# Patient Record
Sex: FEMALE | Race: WHITE
[De-identification: names, ages, dates, MRNs, and addresses within clinical notes are randomized per-mention and may not be internally consistent; named-entity substitution may affect disease eponyms.]

---

## 2019-06-24 NOTE — CON
DATE OF CONSULTATION:  06/24/2019



PRIMARY CARDIOLOGIST:  Sheri Isidro MD



REASON FOR CONSULTATION:  Wide-complex tachycardia.



HISTORY OF PRESENT ILLNESS:  Mrs. Ortiz is a very pleasant 78-year-old white

female, who comes to the hospital for just feeling having difficulty ambulating.

She was seen in the ER in [place] and there was a wide-complex rhythm concerning for

VT, so she was transferred over from [place] to this facility for further evaluation

and care.  She does have a history of atrial fibrillation and she has tachy-allyn

syndrome in the past and had a pacemaker placed as well.  Followed by Dr. Isidro as

well as [place] [facility].  She has had several ablations in the past.

Currently, Mrs. Ortiz feels well.  Denies any chest pain, tightness, pressure.  No

shortness of breath.  No lightheadedness. 



PAST MEDICAL HISTORY:  

1. Sick sinus syndrome, status post pacemaker.

2. Atrial fibrillation.

3. Hypertrophic cardiomyopathy by cardiac MRI, very mild.

4. Normal LV function.

5. Presence of a small PFO with left-to-right shunt.

6. Moderate to severe tricuspid regurgitation.

7. Hypertension.

8. Hyperlipidemia.

9. Chronic anticoagulation with Warfarin.



ALLERGIES:  CODEINE, SULFA, AND PENICILLIN.  THEY ALL GIVE A RASH.



OUTPATIENT MEDICATIONS:  Include:

1. Tylenol p.r.n.

2. Warfarin.

3. Metoprolol 100 mg a day.

4. Gabapentin.

5. Doxazosin.

6. Flecainide 50 mg b.i.d.

7. Losartan 100 mg a day.

8. Macrodantin 50 mg q.i.d.

9. Nifedipine 90 mg a day.

10. Lovastatin 20 mg q.p.m.

11. Paroxetine 20 mg a day.

12. Oxybutynin.

13. Nortriptyline.

14. Clonidine 0.1 mg a day.



PAST SURGICAL HISTORY:  

1. Cholecystectomy.

2. Left breast lump removal.

3. Back surgery twice.

4. Bladder lift.

5. Hysterectomy.

6. Neck surgery.

7. Cardiac ablation in the past.



SOCIAL HISTORY:  No alcohol, tobacco, or drugs.



FAMILY HISTORY:  No significant early coronary artery disease.



REVIEW OF SYSTEMS:  A 12-point review of systems was done and was all negative

unless stated in the history of present illness. 



PHYSICAL EXAMINATION:

VITAL SIGNS:  Temperature 98.1, pulse between , respiratory rate 18,

saturating 98% on 2 L nasal cannula. 

GENERAL:  Awake, alert, oriented x3.  No distress. 

HEENT:  Normocephalic, atraumatic. 

NECK:  Supple. 

LUNGS:  Clear. 

CARDIOVASCULAR:  S1 and S2.  No S3 or S4.  No murmurs.  Heart rate is irregularly

irregular. 

ABDOMEN:  Soft, positive bowel sounds. 

EXTREMITIES:  No edema. 

SKIN:  Warm and dry.



LABORATORY DATA:  Laboratory work was reviewed.  INR of 2.7.  BNP of 733.  Troponin

is undetectable x3.  CMP was reviewed from outside facility. 



Interrogation of the pacemaker showed atrial fibrillation.  She has had several

episodes of trying to be paced out in the atria, just doing antitachycardia pacing

in the atria, which were unsuccessful.  She has been in atrial fibrillation

apparently for the last few weeks now continuously. 



ASSESSMENT AND PLAN:  

1. Wide-complex tachycardia.  This is a paced rhythm and no ventricular tachycardia

has been recorded on pacemaker.  Most likely this is just her atrial fibrillation. 

2. Atrial fibrillation, persistent.

3. Acute on chronic diastolic versus systolic heart failure.



PLAN:  

1. We will increase her dose of IV Lasix to try to diurese her a little bit better.

2. Once she is more stable, we will plan on doing a DEMETRIO cardioversion.

3. Continue amiodarone drip for now.  Likely DEMETRIO cardioversion in the next 2 or 3

days. 

4. We will leave n.p.o. just in case, she is ready for the procedure tomorrow.

5. Echocardiogram to be done.

6. We will follow.







Job ID:  [id number]

## 2019-06-24 NOTE — PDOC.CTH
Cardiology Progress Note





- Labs


 Troponin/CKMB











Troponin I  Less than  0.010 ng/mL (< 0.028)   06/24/19  12:11    














- Assessment/Plan





Cardiology Consult Note





Primary Cardiologist: Sanna





HPI:





This is an 78 F who comes in with chief complaint of malaise and shortness of 

breath which started last night. She has PMH including HTN, HLD, Pacemaker, and 

Ablation in 2007. She denies chest pain or palpitations but has been feeling 

weak. She states that normally she is able to walk around her home without 

issues but since last night has not been feeling well. The feeling started 

when she was laying down last night and nothing has made it better or worse. 

She denies shortness of breath with laying down but states her legs have been 

getting progressively more swollen for a few weeks. EF in dec 2017 was 55%





PMH: HTN, HLD, Aortic and mitral valve regurgitation


PSH: ablation 2007


Meds: clonidine, losartan, oxybutynin, paroxetine, warfarin, nifedipine, 

valacyclovir, metoprolol, gabapentin, flecainide, doxazosin, albuterol


Allergies: sulfa, codeine, penicillin


Soc Hx: denies JAX


Fm Hx: non-contributory





REVIEW OF SYSTEMS: 


Gen: no fever, chills, or sweats, + malaise


Neuro: denies headache


Eyes: no visual changes


ENT: no hearing changes, no sore throat, no congestion


Resp: denies cough, + SOB


Card: no chest pain, denies palpitations, denies diaphoresis, + leg swelling, 

denies orthopnea


GI: no N/V/D, no abdominal pain


MSK: no myalgias, no joint pain/stiffness


Heme: no easy bruising/bleeding, on warfarin


Skin: no rash, no erythema





Vitals:  T: 98.7  R: 18   BP: 133/92  P:90  Sat: 91% on 2L  Wt: 83kg





PHYSICAL EXAMINATION: 


General: NAD, alert and oriented x3


HEENT: PERRLA, EOMI, normal sclera, oropharynx without erythema or exudate


Neck: Supple. Full ROM.


Heart/Cardiovascular System: irregularly irregular rhythm, Cap refill < 3 

seconds, no rub, no murmur


Lungs/Respiratory System:  CTA-B, no resp distress


Abdomen/Gastro-Intestinal System: no abdominal tenderness, normal bowel sounds


Extremities: Warm extremities. +3 pitting edema bilaterally


Neuro: No gross deficits appreciated. CN 2-12 grossly intact


Psychiatry: Awake, Alert and cooperative with exam


Skin: No lesions, rashes, or ulcers


Musculoskeletal: Full ROM





A/P:





# Recurrent PVCs, afib rate controlled


- bmp WNL, INR 2.7 on coumadin


- received 150mg amio IV at outside ED, currently on drip


- pacemaker interrogation shows 90% Afib/A flutter


- review of pacer interrogation shows paced rhythm, ATPs, not VF/VT


- trop neg x2





- EP consulted appreciate recs





# CHF, possible mild exacerbation


- wt 78kg in dec 2017, currently 83kg


- cxr showed mild bilateral pleural effusions


- BNP, echo pending


- lasix 40mg BID





-   See Dr. Izaguirre note to follow

## 2019-06-24 NOTE — CON
DATE OF CONSULTATION:  06/24/2019



ADDITIONAL REFERRING PHYSICIAN:  Naz Crooks MD



HISTORY OF PRESENT ILLNESS:  I am seeing Ms. Ortiz at our [facility] ICU as an Electrophysiology consultant.  Her problems are; 

1. Wide-complex beat likely pacing so far did not find ventricular tachycardia.

2. Atrial fibrillation with rapid ventricular rates persisting since the 
beginning

of March. 

a. Prior history of paroxysmal atrial fibrillation, reasonably suppressed in the

past with low-dose flecainide. 

    b. Hypertrophic cardiomyopathy by cardiac MRI.

c. 2D echo from 10/30/2017 reveals LVEF 65%, mild AI, mild left atrial 
enlargement,

moderate-to-severe TR, small PFO with left-to-right shunt, moderate MR, mild 
AS. 

    d. Stress test on 12/18/2017 without ischemia, LVEF 67%.

3. History of PFO with left-to-right shunt.

4. History of hypertension and hyperlipidemia.

5. CHADS-VASc score of 4 with a gender, hypertension on warfarin therapy with

adequate INR today at 2.7. 



ALLERGIES:  CODEINE, SULFA, AND PENICILLIN.



MEDICATIONS:  At home included;

1. Amiodarone.

2. Magnesium.

3. Tylenol.

4. Bisacodyl.

5. Calcium carbonate.

6. Ondansetron.

7. Senokot.

8. Benzonatate.

9. Clonidine.

10. Guaifenesin.

11. Hydralazine.

12. Nitroglycerin.

13. Sennoside.

14. Sodium chloride.

15. Famotidine.

16. Furosemide. 



This is a current admission medications.



SUBJECTIVE:  Ms. Ortiz is here with progressive dyspnea.  She had difficulty 
to

get around when she was ambulating.  On the other hand, she denies PND or 
orthopnea.

 She has noticed some swelling, especially in her lower extremities.  Recently, 
she

was diagnosed with herpes zoster with rash on the left side of her chest.  She 
is

receiving medications for that.  She does not pass out.  No stroke-like 
symptoms.

No neurological deficits.  She has no fever or chills.  No cough.  No bleeding

issues.  Continues warfarin, adjusted by Dr. Ferguson and rest of 12-point review 
of

system otherwise unremarkable. 



PAST MEDICAL HISTORY:  As above.  The patient had history of sick sinus syndrome

post pacemaker implantation with a Medtronic Advisa dual-chamber device from

September 2018. 



SOCIAL HISTORY:  The patient denies smoking, EtOH, or drug abuse.  She has a

daughter involved in her care and present today as well. 



FAMILY HISTORY:  Not contributory.



OBJECTIVE DATA:  VITAL SIGNS:  Blood pressure is 125/67, heart rate 86, 
respirations

18, and temperature is afebrile. 

GENERAL:  Alert and oriented woman with elevated BMI. 

NECK:  Supple.  Jugular veins are distended. 

CHEST:  Coarse, I do not hear crackles. 

HEART:  Sounds are irregularly irregular.  S1 and S2 are variable.  No murmur or

gallop. 

ABDOMEN:  Benign.  Bowel sounds positive. 

EXTREMITIES:  Lower extremities are without edema, clubbing, or cyanosis. 

NEUROLOGIC:  The patient is nonfocal. 

MUSCULOSKELETAL:  Without joint swelling or deformity. 

SKIN:  Without rash.  The epicardial pacemaker insertion site is well healed.



LABORATORY DATA:  Lab data is pending from today.  The EKG was reviewed 
revealing

atrial fibrillation with intermittent ventricular pacing.  Some EKGs do 
demonstrate

frequent pacing spike correlating to atrial and tachycardia pacing. 



The pace interrogation was reviewed and revealing a Medtronic Advisa DR dual-
chamber

pacemaker with adequate battery voltage, implanted many years ago.  The lead

impedance 418 ohms in both leads.  Sensing 0.8 mV, fibrillation is 5.6 mV right

ventricle.  The ablation seems to persist since the beginning of March.  I also

reviewed lab data from the [facility], BNP is 713.  BUN is 16,

creatinine 0.84, AST and ALT is 21 and 14.  White cell count 5.7, hemoglobin 
10.7,

and platelet count is 323.  Chest x-ray shows cardiomegaly and pulmonary 
vascular

congestion, bilateral pleural effusion. 



ASSESSMENT AND PLAN:  Ms. Ortiz is a pleasant 78-year-old woman with prior 
history

of paroxysmal atrial fibrillation - previously well suppressed with flecainide. 
Now, although she 

has been taking this medication, she is persisting atrial

fibrillation since March.  She has rapid ventricular rates.

Also has some test findings and signs and symptoms of fluid overload/congestive

heart failure exacerbation. 



We discussed the connection between atrial fibrillation rapid rates, the heart

failure, and her symptoms.  She has been already started on IV amiodarone, 
which has

lowered her heart rates and she is feeling slightly better.  She is also being

diuresed. 



Future plans were discussed with Dr. Izaguirre.  It would be reasonable to continue

diuresis and optimize her volume status along with ventricular rate control.  
Once

that was achieved, cardioversion could be considered.  At this point, non-
reasonable

to continue short-term IV and then p.o. amiodarone therapy, although now 
consider

not using it long-term, has a potential side effects. 



Long-term - a pulmonary venous isolation procedure could be considered instead 
of chronic

 amiodarone therapy. Alternatively, AV jakob blocking agents or AV jakob 
ablation is a possibility. 



Heart failure exacerbation likely diastolic in nature, hence there are 
previously

normal LVEF.  Repeat echo is advised.  She also had some degree of hypertrophic

cardiomyopathy, likely adding to the diastolic dysfunction. 



CHADS-VASc score of 4 or now 5 with congestive heart failure, clear risk for 
stroke,

I would recommend continued warfarin or alternating NOAC therapy long-term.  
Her INR

is adequate today. 



Discussed the plans with Dr. Izaguirre and Dr. Crooks will be notified and we will

plan to follow up with you. 



Thank you again for allowing to participate in care of this patient.







Job ID:  [id number]



CATARINA

## 2019-06-24 NOTE — CON
DATE OF CONSULTATION:  



HISTORY OF PRESENT ILLNESS:  Danni Ortiz is an 78-year-old female, who presented

to the ER with rapid heart rate, malfunctioning pacemaker.  She sees a local

cardiologist.  She is now on an amiodarone drip.  She is in the MICU.  Reason for

consult, nonsmoker, no history of pneumonia or TB asthma. 



Apparently, she has had history of blood clots in the past, though.  She takes

anticoagulation for a long periods of time.  She has had herpes zoster infection for

the last week, seen by primary care physician in [place], [place], placed on

medication.  Her legs are swollen according to her daughter.  Denies any snoring. 



PAST MEDICAL HISTORY:  SVT, hypertension, high cholesterol, pacemaker.



PAST SURGICAL HISTORY:  __________ left breast, bladder lift, gallbladder ablation

in 1999, hysterectomy, neck surgery. 



HABITS:  Alcohol, none.  Tobacco, none.  Substance, none.



HOME MEDICATIONS:  

1. Clonidine 0.1.

2. Coumadin.

3. Paxil 20.

4. Ditropan 10.

5. Pamelor 10.

6. Metoprolol 100.

7. Mevacor 20.

8. Losartan 100.

9. Gabapentin 3 times a day.

10. Tambocor 50 twice a day.

11. Doxazosin __________.



ALLERGIES:  CODEINE, PENICILLIN, AND SULFA.



SOCIAL HISTORY:  She worked for [employer].



FAMILY HISTORY:  Unremarkable.



REVIEW OF SYMPTOMS:  Negative.



PHYSICAL EXAMINATION:

VITAL SIGNS:  Saturations are 93%-94% on supplemental oxygen, blood pressure 143/84,

pulse 80 to 90, respirations 16. 

CHEST:  No wheezing or crackle. 

CARDIAC:  Normal S1 and S2.  No gallops. 

ABDOMEN:  No masses.



LABORATORY DATA/IMAGING STUDIES:  INR is 2.7.  Otherwise lab from [place] was

otherwise as noted. 



EKG shows SVT. 



Lytes are normal.  Potassium 3.7, BUN and creatinine are unremarkable.  White count

is 5000 noted. 



X-ray shows cardiomegaly, vascular congestion, small bilateral pleural effusions.



IMPRESSION:  New onset supraventricular tachycardia, congestive heart failure,

herpes zoster. 



PLAN:  Pulmonary wise, continue amiodarone, Lasix, diuretics. 



Pulmonary will follow on the MICU. 



Consultation note is 70 minutes, 50% direct patient care.







Job ID:  [id number]

## 2019-06-24 NOTE — HP
PRIMARY CARE PHYSICIAN:  Dr. Eliu Ferguson.



CHIEF COMPLAINT:  Chest pain and shortness of breath.



HISTORY OF PRESENT ILLNESS:  Ms. Ortiz is a pleasant 78-year-old female with past

medical history of atrial fibrillation, status post ablation in 2007 as well as

pacemaker placement, hypertension, dyslipidemia, who presented to the emergency room

with above-mentioned complaints.  Initially, she has presented to [facility] and was worked up there.  She was found to be in ventricular tachycardia,

required amiodarone bolus and then drip.  She was then transferred to our facility

for further evaluation and care.  Here upon presentation, her blood pressure was

133/92 with a heart rate of 92.  She continued to be in and out of ventricular

tachycardia and was bolused again by the ER physician with amiodarone dose.  Now,

amiodarone has been continued. 



The patient has history of atrial fibrillation and has been on flecainide and

Coumadin.  Her INR was 2.4 today. 



Her presenting symptoms included chest pain, shortness of breath, subjective fever,

nausea, and vomiting.  Pacemaker print out showed that the patient has been in

atrial fibrillation 90% of the time.  She is now being admitted for arrhythmias for

further workup.  ER physician has discussed the case with Cardiology on-call Dr. Izaguirre.  The patient has also seen Electrophysiology, Dr. Russell, in his clinic on a

semi-regular basis.  Her cardiac enzymes have been negative, but her BNP is elevated

and chest x-ray showed mild bilateral pleural effusion and pulmonary vascular

congestion.  She was also given magnesium sulfate 2 g in the ER. 



PAST MEDICAL HISTORY:  

1. History of atrial fibrillation, status post ablation in 2007.

2. Hypertension.

3. Dyslipidemia.

4. Pacemaker placement.



PAST SURGICAL HISTORY:  

1. Cholecystectomy.

2. Left breast lump removal.

3. Back surgery x2.

4. Bladder lift.

5. Hysterectomy.

6. Neck surgery.



PSYCHIATRIC HISTORY:  Depression.



SOCIAL HISTORY:  No history of drug, tobacco, or alcohol abuse.  Lives at home with

her family. 



FAMILY HISTORY:  No significant family history of coronary artery disease.  Her

sister had diabetes and stroke. 



ALLERGIES:  INCLUDE PENICILLIN AND SULFONAMIDES.



HOME MEDICATIONS:  As listed in the ER records;

1. Clonidine 0.1 mg daily.

2. Losartan 100 mg daily.

3. Lovastatin 20 mg daily.

4. Nitrofurantoin 50 mg daily.

5. Nortriptyline 10 mg daily.

6. Oxybutynin daily.

7. Paroxetine 20 mg daily.

8. Warfarin 2.5 mg daily.

9. Nifedipine 90 mg in the morning.

10. Valacyclovir 1000 mg t.i.d.

11. Metoprolol tartrate 100 mg daily.

12. Gabapentin 300 mg t.i.d.

13. Flecainide 50 mg daily.

14. Doxazosin 4 mg b.i.d.

15. Albuterol inhaler p.r.n.



CODE STATUS:  Full code discussed with the patient in detail.



REVIEW OF SYSTEMS:  Review of system is done.  It is negative except for those

mentioned in the history and physical. 



LABORATORY DATA:  The labs are reviewed that were done in [facility]

Emergency Room.  Her BNP is 731.  INR 2.3 with PT of 26, BUN 16, creatinine 0.84,

and bicarb 22.  Chest x-ray by my review shows mild pulmonary vascular congestion.

Troponin is less than 0.01, and her CBC is unremarkable except for hemoglobin at

10.7.  A strip evaluation __________ shows ventricular tachycardia with frequent

pacer spikes.  A 12-lead EKG shows AFib with RVR with heart rate 106 with frequent

PVCs. 



PHYSICAL EXAMINATION:

VITAL SIGNS:  Most recent vital signs; blood pressure 118/77, respirations 20,

saturating 91% on 2 L oxygen, pulse of 90, and temperature 98.5. 

GENERAL:  No acute distress.  She does look somewhat tired, but in no acute

distress.  Family is at bedside. 

HEENT:  Mucous membrane is moist and pink.  No oropharyngeal exudate or erythema.

Head is normocephalic and atraumatic.  Pupils are equal and reactive to light and

accommodation.  Extraocular movement intact. 

NECK:  Supple without any lymphadenopathy, JVD, or bruit. 

CHEST:  Clear to auscultation except for some few bibasilar crackles.  Her rate and

rhythm are irregularly irregular without any significant murmurs. 

ABDOMEN:  Soft, nontender, and nondistended with positive bowel sounds. 

EXTREMITIES:  Showed +3 pitting edema bilaterally. 

NEUROLOGIC:  Nonfocal. 

SKIN:  Free of any rashes or bruises.  Feels warm and dry to touch. 

PSYCHIATRIC:  Normal affect.



IMPRESSION AND PLAN:  

1. Cardiac arrhythmias.  She has been having ventricular tachycardia interspersed

with atrial fibrillation with rapid ventricular rate.  We will continue her on

amiodarone dose.  She is therapeutic with her Coumadin that she is compliant with.

Pacemaker interrogation reveals pacemaker malfunction.  We will consult Cardiology

and Electrophysiology for this very reason.  Continue to trend cardiac enzymes.  Her

second set of troponin is also negative.  She has been given Lasix in the ER and has

been diuresing.  We will add small dose of daily Lasix as well.  Transthoracic

echocardiogram will be also obtained.  She will be admitted to [facility] for frequent

ventricular tachycardia.  She is currently hemodynamically stable. 

2. Pulmonary edema.  She has flash pulmonary edema from arrhythmia.  She appears

comfortable for now.  We will provide her with another small dose of Lasix in the

morning.  Monitor clinically. 

3. History of atrial fibrillation.  As above, the patient's pacemaker needs to be

interrogated and adjusted further.  We will have EP, Dr. Russell, to see the patient,

who has seen the patient in the outpatient setting.  At this time, we will hold any

further anticoagulation as the patient is fully anticoagulated and she may need to

undergo some sort of cardiac procedure.  She will be kept n.p.o. for now. 

4. Hypertension, currently on the lower side.  We will hold her antihypertensives

and use p.r.n. antihypertensives for now. 

5. Dyslipidemia.  We will restart her statin.

6. History of anxiety.  Once again, restart her home medications when she is cleared

to take oral. 

7. Code status, full code discussed with the patient.

8. Deep venous thrombosis and gastrointestinal prophylaxis, but hold the

pharmacological deep venous thrombosis prophylaxis until Cardiology and EP have seen

the patient. 



DISPOSITION:  Ms. Ortiz is currently being admitted to [facility] for ventricular

tachycardia and atrial fibrillation with rapid ventricular rate.  Further management

will depend upon her clinical course, but estimated length of stay at this time is

at least 2 to 3 midnights. 







Job ID:  [id number]

## 2019-06-25 NOTE — PDOC.PN
- Subjective


Encounter Start Date: 06/25/19


Encounter Start Time: 14:15


Subjective: f/u for A-fibrillation RVR on Amiodarone gtt. Plan for DEMETRIO/

Cardioversion.





- Objective


MAR Reviewed: Yes


Vital Signs & Weight: 


 Vital Signs (12 hours)











  Temp Pulse Ox


 


 06/25/19 08:00   97


 


 06/25/19 07:07  97.9 F 


 


 06/25/19 03:53  97.8 F 








 Weight











Weight                         186 lb 14.4 oz











 Most Recent Monitor Data











Heart Rate from ECG            117


 


NIBP                           124/85


 


NIBP BP-Mean                   98


 


Respiration from ECG           16


 


SpO2                           98














I&O: 


 











 06/24/19 06/25/19 06/26/19





 06:59 06:59 06:59


 


Intake Total  790 


 


Output Total  0803 2080


 


Balance  -3875 -2650











Result Diagrams: 


 06/25/19 04:41





 06/25/19 04:41


Additional Labs: 





 Laboratory Tests











  06/24/19 06/24/19 06/24/19





  12:11 12:37 15:47


 


PT   28.4 H 


 


INR   2.7 


 


Troponin I  Less than  0.010   Less than  0.010


 


B-Natriuretic Peptide   














  06/24/19 06/24/19





  15:47 18:10


 


PT  


 


INR  


 


Troponin I   Less than  0.010


 


B-Natriuretic Peptide  733.0 H 











Radiology Reviewed by me: Yes (2D echo - EF 60-65%, mod TR, mod LAE)


EKG Reviewed by me: Yes (Tele - A-fib)





Phys Exam





- Physical Examination


Constitutional: NAD


HEENT: PERRLA, sclera anicteric, oral pharynx no lesions


Neck: no nodes, no JVD, supple, full ROM


few basilar crackles


Respiratory: no wheezing


S1, S2


Cardiovascular: no rub, gallop, irregular


Gastrointestinal: soft, non-tender, no distention, positive bowel sounds


Musculoskeletal: pulses present, edema present


Neurological: normal sensation, moves all 4 limbs


Psychiatric: A&O x 3


Skin: normal turgor, cap refill <2 seconds





Dx/Plan


(1) Atrial fibrillation with RVR


Code(s): I48.91 - UNSPECIFIED ATRIAL FIBRILLATION   Status: Acute   Comment: 

Persistent, DEMETRIO/CV today, continue anticoagulation, rate-control measures   





(2) Acute respiratory failure with hypoxia


Code(s): J96.01 - ACUTE RESPIRATORY FAILURE WITH HYPOXIA   Status: Acute   

Comment: Secondary to #1, O2 supplementation   





(3) Chronic anticoagulation


Code(s): Z79.01 - LONG TERM (CURRENT) USE OF ANTICOAGULANTS   Status: Chronic   

Comment: Continue Coumadin after DEMETRIO/CV   





(4) HTN (hypertension)


Code(s): I10 - ESSENTIAL (PRIMARY) HYPERTENSION   Status: Chronic   


Qualifiers: 


   Hypertension type: essential hypertension   Qualified Code(s): I10 - 

Essential (primary) hypertension   


Comment: Resume home BP regimen when tolerating po intake, serial monitoring   





- Plan


PT/OT, , respiratory therapy, DVT proph w/SCDs


Continue supportive mgmt


-: Resume home BP regimen when tolerating po


-: Continue Amiodarone


-: Continue Lasix 40mg IV BID


-: AM lab: BMP, CBC





* .

## 2019-06-25 NOTE — RAD
EXAM: Single view of the chest



HISTORY:   CHF



COMPARISON: 6/24/2017



FINDINGS: Single view of the chest shows an enlarged cardiomediastinal silhouette.  A pacemaker seen 
with leads in the right atrium and ventricle. Atelectasis is seen in the right lung base.  There is

no evidence of consolidation, mass, or pleural effusion. Degenerative changes are seen in the spine.



IMPRESSION: Cardiomegaly



Reported By: Sridhar Pink 

Electronically Signed:  6/25/2019 7:42 AM

## 2019-06-25 NOTE — PDOC.CTH
Cardiology Progress Note





- Subjective





EP PROGRESS NOTE: 6/25/19





Seen as follow up for AF RVR and medication management. Doing fair but remains 

in AF RVR. Dyspnea under better control today, breathing easier. 





- Objective


 Vital Signs











  Temp Pulse Ox


 


 06/25/19 16:00  98.6 F 


 


 06/25/19 08:00   97


 


 06/25/19 07:07  97.9 F 








 











Weight                         186 lb 14.4 oz














 











 06/24/19 06/25/19 06/26/19





 06:59 06:59 06:59


 


Intake Total  790 


 


Output Total  3475 2650


 


Balance  -2685 -2650














- Physical Examination


General/Neuro: alert & oriented x3, NAD


Neck: carotid US brisk, no JVD present


Lungs: CTA, unlabored respirations


Heart: PMI normal, other: (AF RVR)


Abdomen: NT/ND, soft





- Telemetry


Telemetry Rhythm: AF RVR





- Labs


Result Diagrams: 


 06/25/19 04:41





 06/25/19 04:41


 Troponin/CKMB











Troponin I  Less than  0.010 ng/mL (< 0.028)   06/24/19  18:10    














- Assessment/Plan





1. Atrial fibrillation


   - refractory to flecainide.


   - now on IV amiodarone. Will eventually transition to PO. Good short term 

option but poor long term due to side effects and toxicity concerns. 


   - DEMETRIO/CV with Dr Izaguirre scheduled today


2. Cardiomyopathy


   - diastolic


   - hypertrophic septum


   -likely exacerbated by tachycardia


   -continue to diurese


3. Chronic OAC for elevated CHADS2-VASC: 5


   - continue warfarin. Has been on NOAC in the past, unable to afford. 





DEMETRIO/CV to attempt to restore SR. Then transition to PO amio. Continue OAC with 

Warfarin, monitoring INR.

## 2019-06-25 NOTE — PRG
DATE OF SERVICE:  06/25/2019



SUBJECTIVE:  This morning, awake, alert, and responsive.  She is better.  Echo 
was

normal.  She had a slightly elevated right ventricular systolic pressure of 40.

Severe mitral annular calcification.  Still atrial fibrillation. 



OBJECTIVE:  VITAL SIGNS:  Saturations are 97% on 3 L, temperature 97, blood 
pressure

120/72, respiratory rate 18. 

CHEST:  Decreased breath sounds.  No wheezing. 

CARDIAC:  Normal S1, S2.  No gallops. 

ABDOMEN:  No masses.



LABORATORY DATA:  BNP is 733.  Lytes are normal.



IMPRESSION:  Congestive heart failure, diastolic dysfunction, atrial 
fibrillation,

pulmonary hypertension. 



PLAN:  Continue cardiac care.  Pulmonary will follow while in the MICU.







Job ID:  [id number]



MTDD

## 2019-06-26 NOTE — PDOC.CTH
Cardiology Progress Note





- Subjective





EP PROGRESS NOTE: 6/26/19





Seen as follow up for atrial arrhythmias. 


+SOB/ dyspnea, confusion, orthopnea, heart racing. 


-chest pain, dizziness, passing out











- Objective


 Vital Signs











  Temp Pulse Pulse Ox


 


 06/26/19 13:43   112 H 


 


 06/26/19 08:00    95


 


 06/26/19 07:00  98.4 F  


 


 06/26/19 03:21  97.8 F  








 











Weight                         186 lb 14.4 oz














 











 06/25/19 06/26/19 06/27/19





 06:59 06:59 06:59


 


Intake Total 790 837 


 


Output Total 3475 3550 


 


Balance -8990 -7910 














- Physical Examination


General/Neuro: other: (moderate respiratory distress. Alert but with AMS)


Neck: other: (+JVD)


Lungs: other: (B/L crackles R>L. tachypnea. labored)


Heart: other: (tachycardic)


Abdomen: NT/ND, soft





- Telemetry


Telemetry Rhythm: Sinus tachycardia





- Labs


Result Diagrams: 


 06/26/19 05:55





 06/26/19 05:55


 Troponin/CKMB











Troponin I  Less than  0.010 ng/mL (< 0.028)   06/24/19  18:10    














- Assessment/Plan





1. Atrial fibrillation


   -s/p DEMETRIO/CV on 6/25. Remains in SR through this AM. Now in sinus tachycardia


   -OAC with warfarin, continue


   - IV amiodarone gtt. Continue gtt. Consider PO once medically stable


2. Diastolic heart failure


3. Flash pulmonary edema


   -Hypoxia, pO2 56 on ABG, pH 7.39


4. Pacemaker in situ





Patient found in flash pulmonary edema with moderate respiratory distress and 

monitor sats of 80%. Congestion on CXR. Improved with bipap and


 80mg IV lasix. Sinus tachycardia 130bpm throughout acute distress, SR now 90-

100. Stat bedside echo showed an acute/severe reduction in EF since DEMETRIO 

yesterday. 12 lead EKG did not reveal and acute ST changes and showed ST. 

Discussed with cardiology regarding possible ACS vs acute heart failure 

decompensation. Stat cardiac enzymes pending. Remains on bipap but no longer in 

acute distress. 





For EP, continue OAC on warfarin and IV amio for now. PO amio once medically 

stabilized

## 2019-06-26 NOTE — PDOC.PN
- Subjective


Encounter Start Date: 06/26/19


Encounter Start Time: 10:35


Subjective: f/u for A-fib RVR s/p DEMETRIO/CV now with SR. Remains on Amiodarone,


-: Coumadin. Nursing reports pt confused, hallucinating and did not


-: sleep well overnight.





- Objective


MAR Reviewed: Yes


Vital Signs & Weight: 


 Vital Signs (12 hours)











  Temp Pulse Ox


 


 06/26/19 08:00   95


 


 06/26/19 07:00  98.4 F 


 


 06/26/19 03:21  97.8 F 


 


 06/25/19 23:31  97.6 F 








 Weight











Weight                         186 lb 14.4 oz











 Most Recent Monitor Data











Heart Rate from ECG            88


 


NIBP                           149/71


 


NIBP BP-Mean                   97


 


Respiration from ECG           23


 


SpO2                           90














I&O: 


 











 06/25/19 06/26/19 06/27/19





 06:59 06:59 06:59


 


Intake Total 790 837 


 


Output Total 3475 3550 


 


Balance -6484 -8951 











Result Diagrams: 


 06/26/19 05:55





 06/26/19 05:55


Additional Labs: 





 Laboratory Tests











  06/24/19 06/24/19 06/24/19





  12:11 12:37 15:47


 


PT   28.4 H 


 


INR   2.7 


 


Troponin I  Less than  0.010   Less than  0.010


 


B-Natriuretic Peptide   














  06/24/19 06/24/19





  15:47 18:10


 


PT  


 


INR  


 


Troponin I   Less than  0.010


 


B-Natriuretic Peptide  733.0 H 











EKG Reviewed by me: Yes (Tele - SR)





Phys Exam





- Physical Examination


Constitutional: NAD


answers questions briefly


HEENT: PERRLA, sclera anicteric, oral pharynx no lesions


Neck: no nodes, no JVD, supple, full ROM


few coarse sounds in bases


Respiratory: no wheezing, no rales, no rhonchi


II/VI DAWN RUSB, S1, S2


Cardiovascular: RRR, no rub, gallop


Gastrointestinal: soft, non-tender, no distention, positive bowel sounds


Musculoskeletal: pulses present, edema present


Neurological: normal sensation, moves all 4 limbs


A x O x 2


Skin: normal turgor, cap refill <2 seconds





Dx/Plan


(1) Atrial fibrillation with RVR


Code(s): I48.91 - UNSPECIFIED ATRIAL FIBRILLATION   Status: Acute   Comment: s/

p DEMETRIO/CV today, continue anticoagulation, rate-control measures, SR currently   





(2) Acute respiratory failure with hypoxia


Code(s): J96.01 - ACUTE RESPIRATORY FAILURE WITH HYPOXIA   Status: Acute   

Comment: Secondary to #1, O2 supplementation   





(3) Acute metabolic encephalopathy


Code(s): G93.41 - METABOLIC ENCEPHALOPATHY   Status: Acute   Comment: Suspect 

multifactorial including sleep deprivation, re-orientation techniques, Ativan 

prn   





(4) Chronic anticoagulation


Code(s): Z79.01 - LONG TERM (CURRENT) USE OF ANTICOAGULANTS   Status: Chronic   

Comment: Continue Coumadin after DEMETRIO/CV   





(5) HTN (hypertension)


Code(s): I10 - ESSENTIAL (PRIMARY) HYPERTENSION   Status: Chronic   


Qualifiers: 


   Hypertension type: essential hypertension   Qualified Code(s): I10 - 

Essential (primary) hypertension   


Comment: Resume home BP regimen when tolerating po intake, serial monitoring   





- Plan


plan discussed w/ family, PT/OT, , respiratory therapy, out of 

bed/ambulate, DVT proph w/SCDs


Stable currently


-: Convert to Amiodarone po


-: Restart Coumadin


-: Restart Nortriptyline, Paxil


-: AM lab: BMP





* .

## 2019-06-26 NOTE — PRG
DATE OF SERVICE:  06/26/2019



SUBJECTIVE:  Danni Ortiz this morning is awake, responsive, somewhat confused.



OBJECTIVE:  VITAL SIGNS:  Temperature 98, heart rate 87, atrial fibrillation, blood

pressure 94/71, and respiratory rate 22. 

CHEST:  Decreased breath sounds.  Minimal crackles. 

CARDIAC:  Normal S1, S2.  No gallops. 

ABDOMEN:  No masses.



IMPRESSION:  

1. Congestive heart failure.

2. Supraventricular tachycardia. 



She is on amiodarone and diuretics.  Supportive care. 



Continue cardiac care.  Pulmonary will follow while in the MICU.







Job ID:  [id number]

## 2019-06-26 NOTE — RAD
EXAM: 

CHEST ONE VIEW



HISTORY:

Respiratory distress and chest congestion.



COMPARISON:

6/25/2019



FINDINGS:

Cardiac silhouette remains enlarged. Pulmonary vasculature is at the upper limits of normal. There ha
s been interval development of pleural and parenchymal changes at each lung base likely related to

a small right and tiny left pleural effusions and associated atelectasis.  There is minimal minimal i
ncreased interstitial densities which could be related to an element of mild pulmonary edema.

Degenerative changes are seen in the spine. A dual-lead left subclavian cardiac pacemaker device karen
ins in place. Vascular calcifications are seen in the thoracic aorta.



IMPRESSION:



1. Cardiomegaly and mild pulmonary vascular congestion with findings suggesting minimal pulmonary deyanira
ma.

2. Interval development of small right and tiny left pleural effusions with associated atelectasis. C
ontinued follow-up is recommended.



Reported By: Josue Cifuentes 

Electronically Signed:  6/26/2019 2:01 PM

## 2019-06-26 NOTE — PDOC.CTH
Cardiology Progress Note





- Subjective





She went into respiratory distress earlier today. She had most likely flash 

pulmonary edema. she denied any chest pain, tightness or pressure only SOB. She 

became tachycardic but still in sinus. Currently more comfortable on BiPAP and 

having had diuresed. 





- Objective


 Vital Signs











  Pulse Resp Pulse Ox


 


 06/26/19 18:47  83  18  96


 


 06/26/19 18:45  92  


 


 06/26/19 13:43  112 H  


 


 06/26/19 08:00    95








 











Weight                         186 lb 14.4 oz














 











 06/25/19 06/26/19 06/27/19





 06:59 06:59 06:59


 


Intake Total 790 837 342


 


Output Total 3475 3550 1100


 


Balance -2803 -2758 -747














- Physical Examination


General/Neuro: NAD


Neck: no JVD present


Lungs: other: (Mild crackles at bases. )


Heart: RRR


Abdomen: NT/ND


Extremities: + edema B (1+)





- Telemetry


Telemetry Rhythm: NSR





- Labs


Result Diagrams: 


 06/26/19 05:55





 06/26/19 05:55


 Troponin/CKMB











CK-MB (CK-2)  3.6 ng/mL (0-6.6)   06/26/19  14:11    


 


Troponin I  0.227 ng/mL (< 0.028)  H  06/26/19  14:11    














- Assessment/Plan





1. Afib RVR


2. S/P DEMETRIO DCCV


3. Worsening LV function from yesterday, possible apical ballooning syndrome. 

ECG does not show ST elevations. 


4. PPM in situ


5. Acute systolic heart failure, flash pulmonary edema.





PLAN:


- Will hold warfarin in preparation for a LHC in the next few days. Will have 

to wait until she is more stable and able to lay flat on cath table. 


- Hopefully her INR will be lower to be able to proceed with LHC.


- Continue amiodarone drip


- Continue to trend troponins. 


- 45 minutes critical care time.

## 2019-06-27 NOTE — PRG
DATE OF SERVICE:  06/27/2019



SUBJECTIVE:  Danni Ortiz, this morning, is awake, alert, responsive.  She 
is off

the BiPAP. 



OBJECTIVE:  VITAL SIGNS:  Her saturations are 95%, pulse 75, respirations 18, 
and

blood pressure 120/75.  Status post ablation. 

CHEST:  Decreased breath sounds.  Bilateral crackles.  No wheezing. 

CARDIAC:  Normal S1 and S2.  No gallops. 

ABDOMEN:  No masses.



DIAGNOSTIC DATA:  X-ray still shows significant bilateral pleural effusion.  
White

count is 7000, platelet count is normal. 



Lytes are normal. 



Potassium is 3.



ASSESSMENT:  

1. Status post ablation.

2. Supraventricular tachycardia.

3. Congestive heart failure.

4. Bilateral pleural effusion.

5. Respiratory failure.



PLAN:  Noninvasive ventilation.PRN   Lasix.  Replace potassium.  We will

follow. 







Job ID:  [id number]



MTDD

## 2019-06-27 NOTE — PQF
CLINICAL DOCUMENTATION IMPROVEMENT CLARIFICATION FORM:  ICD-10 Updated



PLEASE DO AN ADDENDUM TO THE PROGRESS NOTE WITH ANY DOCUMENTATION UPDATES OR 
ADDITIONS AND CARRY THROUGH TO DC SUMMARY.   THANK YOU.



DATE:               6/27/19                                               ATTN:
  DR. DYSON



Please exercise your independent, professional judgment in responding to the 
clarification form. 

Clinical indicators are provided on the bottom of this form for your review



Please check appropriate box(s):

HEART FAILURE:



A.  TYPE:

             [ x ] Systolic / HFrEF      [  ] Diastolic / HFpEF       [  ] 
Combined Systolic / Diastolic

B.  ACUITY

             [  ] Acute          [ x ] Acute on Chronic          [  ] Chronic

[  ] Other diagnosis ___________

[  ] Unable to determine



In addition, please specify:

Present on Admission (POA):  [  x] Yes             [  ] No             [  ] 
Unable to determine



For continuity of documentation, please document condition throughout progress 
notes and discharge summary.  Thank You.



CLINICAL INDICATORS - SIGNS / SYMPTOMS / LABS



H&P: "HER BNP IS ELEVATED AND CHEST XRAY SHOWED MILD BILATERAL PLEURAL EFFUSION 
AND PULMONARY VASCULAR CONGESTION"



PROGRESS NOTE 6/24: "CHF, POSSIBLE MILD EXACERBATION"



PULMONARY NOTE 6/25: "CONGESTIVE HEART FAILURE"



CARDIOLOGY NOTE (JUANCHO) 6/26: "DIASTOLIC HEART FAILURE" 



CARDIOLOGY NOTE (ROYER) 6/26: "ACUTE SYSTOLIC HEART FAILURE"



BNP 6/24:  733



RISKS:

HYPERTENSION

VTACH

AFIB



TREATMENT:

CARDIAC MONITORING

IV LASIX (6/25-PRESENT)

SPIRONOLACTONE ( (START 6/27)

ECHOCARDIOGRAM

CARDIOLOGY AND EP CONSULTS





(This form is maintained as a part of the permanent medical record)

 2015 [employer].  All Rights Reserved

[email or web address] [email or web address]    Office:  [phone]

                                                              

 

[facility]

## 2019-06-27 NOTE — PDOC.PN
- Subjective


Encounter Start Date: 06/27/19


Encounter Start Time: 14:20


Subjective: f/u after apparent episode of flash pulm edema tx with IV Lasix


-: and BiPAP NIMV. Feels better overall. 





- Objective


MAR Reviewed: Yes


Vital Signs & Weight: 


 Vital Signs (12 hours)











  Temp Pulse Resp BP Pulse Ox


 


 06/27/19 14:09   87  20   93 L


 


 06/27/19 11:25  97.3 F L    


 


 06/27/19 09:30   97   116/76 


 


 06/27/19 08:00      100


 


 06/27/19 07:39  97.9 F    


 


 06/27/19 07:26   74  17   98


 


 06/27/19 03:48  98.5 F    








 Weight











Weight                         172 lb 4.8 oz











 Most Recent Monitor Data











Heart Rate from ECG            92


 


NIBP                           92/42


 


NIBP BP-Mean                   58


 


Respiration from ECG           23


 


SpO2                           92














I&O: 


 











 06/26/19 06/27/19 06/28/19





 06:59 06:59 06:59


 


Intake Total 837 572 


 


Output Total 3550 3000 


 


Balance -3921 -5915 











Result Diagrams: 


 06/27/19 05:30





 06/27/19 05:30


Additional Labs: 





 Laboratory Tests











  06/24/17 06/24/19 06/24/19





  15:26 12:11 12:37


 


PT    28.4 H


 


INR    2.7


 


Potassium   


 


Troponin I   Less than  0.010 


 


B-Natriuretic Peptide  696.1 H  














  06/24/19 06/24/19 06/24/19





  15:47 15:47 18:10


 


PT   


 


INR   


 


Potassium   


 


Troponin I  Less than  0.010   Less than  0.010


 


B-Natriuretic Peptide   733.0 H 














  06/25/19 06/26/19 06/26/19





  04:41 05:55 14:11


 


PT   


 


INR   


 


Potassium  3.3 L  3.2 L 


 


Troponin I    0.227 H


 


B-Natriuretic Peptide   














  06/26/19 06/27/19





  19:29 09:16


 


PT  


 


INR   1.9


 


Potassium  


 


Troponin I  0.204 H 


 


B-Natriuretic Peptide  











Radiology Reviewed by me: Yes (PCXR - bilat pleural effusions)


EKG Reviewed by me: Yes (Tele - SR)





Phys Exam





- Physical Examination


Constitutional: NAD


HEENT: PERRLA, sclera anicteric, oral pharynx no lesions


Neck: no nodes, no JVD, supple, full ROM


diminished in bases, few basilar crackles


S1, S2


Cardiovascular: RRR, no rub


obese


Gastrointestinal: soft, non-tender, no distention, positive bowel sounds


mild LE edema


Musculoskeletal: pulses present


Neurological: normal sensation, moves all 4 limbs


Psychiatric: A&O x 3


Skin: normal turgor, cap refill <2 seconds





Dx/Plan


(1) Atrial fibrillation with RVR


Code(s): I48.91 - UNSPECIFIED ATRIAL FIBRILLATION   Status: Acute   Comment: s/

p DEMETRIO/CV today, continue anticoagulation, rate-control measures, SR currently, 

continues on Amiodarone gtt   





(2) Acute respiratory failure with hypoxia


Code(s): J96.01 - ACUTE RESPIRATORY FAILURE WITH HYPOXIA   Status: Acute   

Comment: Secondary to #1, O2 supplementation   





(3) Acute metabolic encephalopathy


Code(s): G93.41 - METABOLIC ENCEPHALOPATHY   Status: Acute   Comment: Suspect 

multifactorial including sleep deprivation, re-orientation techniques, Ativan 

prn   





(4) Acute on chronic systolic CHF (congestive heart failure)


Code(s): I50.23 - ACUTE ON CHRONIC SYSTOLIC (CONGESTIVE) HEART FAILURE   Status

: Acute   Comment: EF 20-25%, continue Lasix but decrease dose due to 

hypotension, continue Spironolactone, Losartan   





(5) Chronic anticoagulation


Code(s): Z79.01 - LONG TERM (CURRENT) USE OF ANTICOAGULANTS   Status: Chronic   

Comment: Continue Coumadin after DEMETRIO/CV   





(6) HTN (hypertension)


Code(s): I10 - ESSENTIAL (PRIMARY) HYPERTENSION   Status: Chronic   


Qualifiers: 


   Hypertension type: essential hypertension   Qualified Code(s): I10 - 

Essential (primary) hypertension   


Comment: Resume home BP regimen when tolerating po intake, serial monitoring   





- Plan


plan discussed w/ family, PT/OT, , respiratory therapy, DVT 

proph w/SCDs


Stable currently


-: Decrease Lasix 20mg IV BID due to hypotension


-: Continue Amiodarone gtt


-: Continue Coumadin


-: AM lab: BMP, CBC, PT/INR





* .

## 2019-06-27 NOTE — PDOC.CTH
Cardiology Progress Note





- Subjective





She is doing much better. She states she does not remember anything from 

yesterday. She is on NC now breathing comfortably. No chest pain. 





- Objective


 Vital Signs











  Temp Pulse Resp BP Pulse Ox


 


 06/27/19 15:28  96.7 F L    


 


 06/27/19 14:09   87  20   93 L


 


 06/27/19 11:25  97.3 F L    


 


 06/27/19 09:30   97   116/76 


 


 06/27/19 08:00      100


 


 06/27/19 07:39  97.9 F    


 


 06/27/19 07:26   74  17   98








 











Weight                         172 lb 4.8 oz














 











 06/26/19 06/27/19 06/28/19





 06:59 06:59 06:59


 


Intake Total 837 572 


 


Output Total 3550 3000 


 


Balance -4981 -9758 














- Physical Examination


General/Neuro: alert & oriented x3, NAD


Neck: no JVD present


Lungs: CTA, unlabored respirations


Heart: RRR


Abdomen: NT/ND


Extremities: + edema B (trace)





- Telemetry


Telemetry Rhythm: NSR





- Labs


Result Diagrams: 


 06/27/19 05:30





 06/27/19 05:30


 Troponin/CKMB











CK-MB (CK-2)  2.9 ng/mL (0-6.6)   06/26/19  19:29    


 


Troponin I  0.204 ng/mL (< 0.028)  H  06/26/19  19:29    














- Assessment/Plan





1. Afib RVR, s/p DCCV remains in sinus. 


2. S/P DEMETRIO DCCV


3. Worsening LV function, possible apical ballooning syndrome. 


4. PPM in situ


5. Acute systolic heart failure, flash pulmonary edema. Resolved.








PLAN:


- Hold warfarin in preparation for a [facility] once INR under 2.0.


- Continue amiodarone drip


- Will cut back on lasix. 


- Replace K


- 45 minutes critical care time.

## 2019-06-27 NOTE — PDOC.CTH
Cardiology Progress Note





- Subjective











EP PROGRESS NOTE: 6/27/19





Seen as follow up for atrial arrhythmias. Feeling much improved today. Off 

bipap. breathing easier. No heart racing/palpitations. 





- Objective


 Vital Signs











  Temp Pulse Resp BP Pulse Ox


 


 06/27/19 14:09   87  20   93 L


 


 06/27/19 11:25  97.3 F L    


 


 06/27/19 09:30   97   116/76 


 


 06/27/19 08:00      100


 


 06/27/19 07:39  97.9 F    


 


 06/27/19 07:26   74  17   98


 


 06/27/19 03:48  98.5 F    








 











Weight                         172 lb 4.8 oz














 











 06/26/19 06/27/19 06/28/19





 06:59 06:59 06:59


 


Intake Total 837 572 


 


Output Total 3550 3000 


 


Balance -5357 -8678 














- Physical Examination


General/Neuro: alert & oriented x3, NAD


Neck: carotid US brisk, no JVD present


Lungs: CTA, unlabored respirations


Heart: PMI normal, RRR


Abdomen: NT/ND, soft





- Telemetry


Telemetry Rhythm: SR





- Labs


Result Diagrams: 


 06/27/19 05:30





 06/27/19 05:30


 Troponin/CKMB











CK-MB (CK-2)  2.9 ng/mL (0-6.6)   06/26/19  19:29    


 


Troponin I  0.204 ng/mL (< 0.028)  H  06/26/19  19:29    














- Assessment/Plan








1. Atrial fibrillation


   - s/p DEMETRIO/CV on 6/25. Remains in SR through this AM. Now in sinus rhythm


   - OAC with warfarin, continue


   - IV amiodarone gtt. Continue gtt. Consider PO once medically stable


2. Diastolic heart failure 





3. Flash pulmonary edema 6/26/19


   -resolved with lasix and bipap currently on NC


4. Pacemaker in situ


5. Severely reduced LVEF 20-25% by echo 6/26/19








Cardiology plans for [facility] once INR in acceptable range. 


For EP, continue IV amio for now. Likely transition to PO tomorrow.

## 2019-06-28 NOTE — PDOC.CTH
Cardiology Progress Note





- Subjective





Doing much better. breathing close to baseline.








- Objective


 Vital Signs











  Temp Pulse Resp BP Pulse Ox


 


 06/28/19 13:18   95  22 H   99


 


 06/28/19 08:00  98.7 F  97  23 H  120/66  93 L


 


 06/28/19 07:56      93 L


 


 06/28/19 07:53   105 H  26 H   93 L








 











Weight                         172 lb 4.8 oz














 











 06/27/19 06/28/19 06/29/19





 06:59 06:59 06:59


 


Intake Total 572 260 


 


Output Total 3000 150 


 


Balance -2428 110 














- Physical Examination


General/Neuro: alert & oriented x3, NAD


Neck: no JVD present


Lungs: CTA, unlabored respirations


Heart: RRR


Abdomen: NT/ND


Extremities: + edema B (1+)





- Telemetry


Telemetry Rhythm: NSR





- Labs


Result Diagrams: 


 06/28/19 03:57





 06/28/19 03:57


 Troponin/CKMB











CK-MB (CK-2)  2.9 ng/mL (0-6.6)   06/26/19  19:29    


 


Troponin I  0.204 ng/mL (< 0.028)  H  06/26/19  19:29    














- Assessment/Plan





1. Afib RVR, s/p DCCV remains in sinus. 


2. S/P DEMETRIO DCCV


3. Worsening LV function, possible apical ballooning syndrome. 


4. PPM in situ


5. Acute systolic heart failure, flash pulmonary edema. Resolved.


6. SHANNAN in CKD likely form overdiureses, agree with gentle iv fluids. 





PLAN:


- Continue to hold warfarin in preparation for a [facility] Monday.


- Switch amiodarone to PO. 


- Hold lasix.


- Will follow.

## 2019-06-28 NOTE — RAD
XR Abdomen 1 View/KUB



History: Abdominal distention



Comparison: None.



Findings: Moderate gaseous distention of the stomach. No dilated air-filled loops of large or small b
owel.



Posterior spinal fusion hardware L3-S1. Mild degenerative changes of the SI joints.



Moderate stool burden within the colon. Evaluation for free air is limited without an upright examina
tion.



Impression: Moderate stool burden. No evidence for bowel obstruction.



Reported By: Naeem Silva 

Electronically Signed:  6/28/2019 7:59 AM

## 2019-06-28 NOTE — PDOC.CTH
Cardiology Progress Note





- Subjective








EP PROGRESS NOTE: 6/28/19





Seen as follow up for atrial arrhythmias. Feeling much improved today. Off 

bipap. breathing easier. No heart racing/palpitations. Having constipation but 

finally a BM today so she is feeling relieved. 





- Objective


 Vital Signs











  Temp Pulse Resp BP Pulse Ox


 


 06/28/19 08:00  98.7 F  97  23 H  120/66  93 L


 


 06/28/19 07:56      93 L


 


 06/28/19 07:53   105 H  26 H   93 L


 


 06/28/19 04:00  98.1 F    








 











Weight                         172 lb 4.8 oz














 











 06/27/19 06/28/19 06/29/19





 06:59 06:59 06:59


 


Intake Total 572 260 


 


Output Total 3000 150 


 


Balance -2428 110 














- Physical Examination


General/Neuro: alert & oriented x3, NAD


Neck: carotid US brisk, no JVD present


Lungs: CTA, unlabored respirations


Heart: PMI normal, RRR


Abdomen: NT/ND, soft





- Telemetry


Telemetry Rhythm: SR





- Labs


Result Diagrams: 


 06/28/19 03:57





 06/28/19 03:57


 Troponin/CKMB











CK-MB (CK-2)  2.9 ng/mL (0-6.6)   06/26/19  19:29    


 


Troponin I  0.204 ng/mL (< 0.028)  H  06/26/19  19:29    














- Assessment/Plan








1. Atrial fibrillation


   - s/p DEMETRIO/CV on 6/25. Remains in SR through this AM. Now in sinus rhythm


   - OAC with warfarin, continue


   - continue amiodarone


2. Diastolic heart failure 





3. Flash pulmonary edema 6/26/19


   -resolved with lasix and bipap currently on NC


4. Pacemaker in situ





5. Severely reduced LVEF 20-25% by echo 6/26/19





6. Hypomagnesemia


   -1.1 this AM. 2 grams IV with recheck after infusion 





Cardiology has tentative plans for [facility] Monday. Heart rhythm stable. Will stop 

IV amiodarone and starting PO regimen.

## 2019-06-28 NOTE — PDOC.EVN
Event Note





- Event Note


Event Note: 





RN called - Pt constipated with abd bloating. KUB ordered. Pt refusion enema or 

supp.

## 2019-06-28 NOTE — PDOC.PN
- Subjective


Encounter Start Date: 06/28/19


Encounter Start Time: 14:30


Subjective: Admitted with chest pain and SOB. 


-: Hospital course was complicated by flash pulm edema.


-: complains of ill feeling and constipation. 





- Objective


Vital Signs & Weight: 


 Vital Signs (12 hours)











  Temp Pulse Resp BP Pulse Ox


 


 06/28/19 13:18   95  22 H   99


 


 06/28/19 08:00  98.7 F  97  23 H  120/66  93 L


 


 06/28/19 07:56      93 L


 


 06/28/19 07:53   105 H  26 H   93 L


 


 06/28/19 04:00  98.1 F    








 Weight











Weight                         172 lb 4.8 oz











 Most Recent Monitor Data











Heart Rate from ECG            108


 


NIBP                           127/70


 


NIBP BP-Mean                   89


 


Respiration from ECG           26


 


SpO2                           91














I&O: 


 











 06/27/19 06/28/19 06/29/19





 06:59 06:59 06:59


 


Intake Total 572 260 


 


Output Total 3000 150 


 


Balance -2428 110 











Result Diagrams: 


 06/28/19 03:57





 06/28/19 03:57





Phys Exam





- Physical Examination


Constitutional: NAD


HEENT: moist MMs


Neck: no JVD, supple


fair air entry with transmitted sound


Cardiovascular: RRR


Gastrointestinal: soft, non-tender, no distention, positive bowel sounds


Musculoskeletal: no edema, pulses present


Neurological: non-focal, moves all 4 limbs


Psychiatric: normal affect, A&O x 3





Dx/Plan


(1) Acute systolic heart failure


Code(s): I50.21 - ACUTE SYSTOLIC (CONGESTIVE) HEART FAILURE   Status: Acute   





(2) Acute on chronic diastolic (congestive) heart failure


Code(s): I50.33 - ACUTE ON CHRONIC DIASTOLIC (CONGESTIVE) HEART FAILURE   Status

: Acute   





(3) Wide-complex tachycardia


Code(s): I47.2 - VENTRICULAR TACHYCARDIA   Status: Acute   





(4) SSS (sick sinus syndrome)


Code(s): I49.5 - SICK SINUS SYNDROME   Status: Acute   





(5) Hypokalemia


Code(s): E87.6 - HYPOKALEMIA   Status: Acute   





(6) SHANNAN (acute kidney injury)


Code(s): N17.9 - ACUTE KIDNEY FAILURE, UNSPECIFIED   Status: Acute   





(7) Acute respiratory failure with hypoxia


Code(s): J96.01 - ACUTE RESPIRATORY FAILURE WITH HYPOXIA   Status: Acute   

Comment: Secondary to #1, O2 supplementation   





(8) Chronic anticoagulation


Code(s): Z79.01 - LONG TERM (CURRENT) USE OF ANTICOAGULANTS   Status: Chronic   

Comment: Continue Coumadin after DEMETRIO/CV   





(9) Atrial fibrillation


Code(s): I48.91 - UNSPECIFIED ATRIAL FIBRILLATION   Status: Acute   





(10) HTN (hypertension)


Code(s): I10 - ESSENTIAL (PRIMARY) HYPERTENSION   Status: Chronic   


Qualifiers: 


   Hypertension type: essential hypertension   Qualified Code(s): I10 - 

Essential (primary) hypertension   


Comment: Resume home BP regimen when tolerating po intake, serial monitoring   





(11) Constipation


Code(s): K59.00 - CONSTIPATION, UNSPECIFIED   Status: Acute   





(12) Nausea and vomiting


Code(s): R11.2 - NAUSEA WITH VOMITING, UNSPECIFIED   Status: Acute   





(13) Hypomagnesemia


Code(s): E83.42 - HYPOMAGNESEMIA   Status: Acute   





- Plan


DC nephrotoxic agents and potassium supplementation.


-: increase oral intake advised


-: dulcolax suppository x 1


-: monitor renal function.


-: Start gentle IVF. replete serum magnesium





* .

## 2019-06-28 NOTE — PRG
DATE OF SERVICE:  06/28/2019



SUBJECTIVE:  This morning, the patient is awake, alert, and responsive.  Still got a

cough, but dyspnea has much improved. 



OBJECTIVE:  VITAL SIGNS:  Blood pressure is 114/62, saturations are __________,

respirations 26, pulse 105. 

CHEST:  Decreased breath sounds.  Minimal crackles. 

CARDIAC:  SVT. 

ABDOMEN:  Soft.



LABORATORY DATA:  Creatinine 1.64.  PT is 20.



IMPRESSION:  Congestive heart failure, bilateral pleural effusions, azotemia, atrial

fibrillation, status post cardioversion. 



PLAN:  Symptomatic relief for her cough.  She can probably be transferred to a

monitored bed out of the ICU. 



Pulmonary will follow.







Job ID:  [id number]

## 2019-06-29 NOTE — PDOC.CTH
Cardiology Progress Note





- Subjective





No new issues. No new complaints. 





- Objective


 Vital Signs











  Temp Pulse Resp Pulse Ox


 


 06/29/19 15:15  98.4 F   


 


 06/29/19 13:31   99  24 H  99


 


 06/29/19 11:12  97.6 F   


 


 06/29/19 08:00     93 L


 


 06/29/19 07:51     90 L


 


 06/29/19 07:49   96  22 H  90 L


 


 06/29/19 07:15  97.6 F   








 











Weight                         172 lb 1.6 oz














 











 06/28/19 06/29/19 06/30/19





 06:59 06:59 06:59


 


Intake Total 260 890.8 250


 


Output Total 150 600 275


 


Balance 110 290.8 -25














- Physical Examination


General/Neuro: alert & oriented x3, NAD


Neck: no JVD present


Lungs: CTA, unlabored respirations


Heart: RRR


Abdomen: NT/ND


Extremities: other: (no edema)





- Telemetry


Telemetry Rhythm: NSR





- Labs


Result Diagrams: 


 06/29/19 04:21





 06/29/19 04:21


 Troponin/CKMB











CK-MB (CK-2)  2.9 ng/mL (0-6.6)   06/26/19  19:29    


 


Troponin I  0.204 ng/mL (< 0.028)  H  06/26/19  19:29    














- Assessment/Plan





1. Afib RVR, s/p DCCV remains in sinus. 


2. S/P DEMETRIO DCCV


3. Worsening LV function, possible apical ballooning syndrome. 


4. PPM in situ


5. Acute systolic heart failure, flash pulmonary edema. Resolved.


6. SHANNAN in CKD likely form over diureses resolved with IV fluids. 





PLAN:


- Continue to hold warfarin in preparation for a [facility] Monday.


- Amiodarone PO.

## 2019-06-29 NOTE — PDOC.PN
- Subjective


Encounter Start Date: 06/29/19


Encounter Start Time: 13:51


Subjective: Feeling better. nausea and vomiting have syubsided


-: Having BM


-: Denied chest pain.





- Objective


Vital Signs & Weight: 


 Vital Signs (12 hours)











  Temp Pulse Resp Pulse Ox


 


 06/29/19 13:31   99  24 H  99


 


 06/29/19 11:12  97.6 F   


 


 06/29/19 08:00     93 L


 


 06/29/19 07:51     90 L


 


 06/29/19 07:49   96  22 H  90 L


 


 06/29/19 07:15  97.6 F   


 


 06/29/19 03:39  98.4 F   








 Weight











Weight                         172 lb 1.6 oz











 Most Recent Monitor Data











Heart Rate from ECG            101


 


NIBP                           138/84


 


NIBP BP-Mean                   102


 


Respiration from ECG           21


 


SpO2                           90














I&O: 


 











 06/28/19 06/29/19 06/30/19





 06:59 06:59 06:59


 


Intake Total 260 890.8 250


 


Output Total 150 600 


 


Balance 110 290.8 250











Result Diagrams: 


 06/29/19 04:21





 06/29/19 04:21





Phys Exam





- Physical Examination


Constitutional: NAD


obese, afebrile


HEENT: moist MMs


Neck: supple


Respiratory: no rales, no rhonchi, clear to auscultation bilateral


Regular rhythm with frequent ectopics.


Gastrointestinal: soft, non-tender, no distention, positive bowel sounds


Musculoskeletal: no edema, pulses present


Neurological: non-focal, moves all 4 limbs


Psychiatric: A&O x 3





Dx/Plan


(1) Acute systolic heart failure


Code(s): I50.21 - ACUTE SYSTOLIC (CONGESTIVE) HEART FAILURE   Status: Acute   

Comment: For cardiac cath on monday   





(2) Acute on chronic diastolic (congestive) heart failure


Code(s): I50.33 - ACUTE ON CHRONIC DIASTOLIC (CONGESTIVE) HEART FAILURE   Status

: Acute   





(3) Wide-complex tachycardia


Code(s): I47.2 - VENTRICULAR TACHYCARDIA   Status: Acute   





(4) SSS (sick sinus syndrome)


Code(s): I49.5 - SICK SINUS SYNDROME   Status: Acute   





(5) Hypokalemia


Code(s): E87.6 - HYPOKALEMIA   Status: Acute   





(6) SHANNAN (acute kidney injury)


Code(s): N17.9 - ACUTE KIDNEY FAILURE, UNSPECIFIED   Status: Acute   Comment: 

Resolved with IVF and Holding of lasix.   





(7) Acute respiratory failure with hypoxia


Code(s): J96.01 - ACUTE RESPIRATORY FAILURE WITH HYPOXIA   Status: Acute   

Comment: Secondary to #1, O2 supplementation   





(8) Chronic anticoagulation


Code(s): Z79.01 - LONG TERM (CURRENT) USE OF ANTICOAGULANTS   Status: Chronic   

Comment: Continue Coumadin after DEMETRIO/CV   





(9) Atrial fibrillation


Code(s): I48.91 - UNSPECIFIED ATRIAL FIBRILLATION   Status: Acute   





(10) HTN (hypertension)


Code(s): I10 - ESSENTIAL (PRIMARY) HYPERTENSION   Status: Chronic   


Qualifiers: 


   Hypertension type: essential hypertension   Qualified Code(s): I10 - 

Essential (primary) hypertension   


Comment: Resume home BP regimen when tolerating po intake, serial monitoring   





(11) Constipation


Code(s): K59.00 - CONSTIPATION, UNSPECIFIED   Status: Acute   





(12) Nausea and vomiting


Code(s): R11.2 - NAUSEA WITH VOMITING, UNSPECIFIED   Status: Acute   





(13) Hypomagnesemia


Code(s): E83.42 - HYPOMAGNESEMIA   Status: Acute   





- Plan


Replete serum magnesium with 4 gram of magnesium sulphate


-: DC IVF. Continue to hold lasix.


-: [place] oral intake advised.


-: Start miralax daily


-: Awaiting cardiac cath. Follow electrolytes





* .

## 2019-06-29 NOTE — PRG
DATE OF SERVICE:  06/29/2019



SUBJECTIVE:  Danni Ortiz this morning says she is better, she is less short of

breath. 



OBJECTIVE:  VITAL SIGNS:  Heart rate 97, blood pressure 130/67, and saturations are

92% on 2 L.  Afebrile.  I's and O's have been consistently negative. 

CHEST:  Crackles. 

CARDIAC:  No gallops.  No murmurs. 

ABDOMEN:  Soft.



LABORATORY DATA:  INR is 1.8.  Lytes are back to normal.



IMPRESSION:  Congestive heart failure, pleural effusion, status post cardioversion,

supraventricular tachycardia. 



PLAN:  Pulmonary wise, continue cardiac care.  Pulmonary will follow while in the

MICU. 







Job ID:  [id number]

## 2019-06-30 NOTE — PDOC.PN
- Subjective


Encounter Start Date: 06/30/19


Encounter Start Time: 13:53


Subjective: no new problem


-: Feeling better.


-: Oral intake is better.





- Objective


Vital Signs & Weight: 


 Vital Signs (12 hours)











  Temp Pulse Resp BP Pulse Ox


 


 06/30/19 11:01  98.6 F    


 


 06/30/19 08:02      99


 


 06/30/19 08:01   92  21 H   99


 


 06/30/19 08:00      96


 


 06/30/19 07:21  98.9 F    


 


 06/30/19 04:00  97.9 F  98  24 H  133/90  95








 Weight











Weight                         173 lb 9.6 oz











 Most Recent Monitor Data











Heart Rate from ECG            93


 


NIBP                           140/80


 


NIBP BP-Mean                   100


 


Respiration from ECG           18


 


SpO2                           98














I&O: 


 











 06/29/19 06/30/19 07/01/19





 06:59 06:59 06:59


 


Intake Total 890.8 2004 


 


Output Total 600 1110 


 


Balance 290.8 894 











Result Diagrams: 


 06/30/19 04:35





 06/30/19 04:35





Phys Exam





- Physical Examination


Constitutional: NAD


HEENT: moist MMs


Neck: no JVD


Respiratory: no wheezing, no rhonchi


fair air entry bilaterally


Cardiovascular: RRR


tachycardic


Gastrointestinal: soft, non-tender, no distention, positive bowel sounds


Musculoskeletal: no edema, pulses present


Neurological: non-focal, moves all 4 limbs


Psychiatric: normal affect, A&O x 3





Dx/Plan


(1) Acute systolic heart failure


Code(s): I50.21 - ACUTE SYSTOLIC (CONGESTIVE) HEART FAILURE   Status: Acute   

Comment: For cardiac cath on monday   





(2) Acute on chronic diastolic (congestive) heart failure


Code(s): I50.33 - ACUTE ON CHRONIC DIASTOLIC (CONGESTIVE) HEART FAILURE   Status

: Acute   





(3) Wide-complex tachycardia


Code(s): I47.2 - VENTRICULAR TACHYCARDIA   Status: Acute   





(4) SSS (sick sinus syndrome)


Code(s): I49.5 - SICK SINUS SYNDROME   Status: Acute   





(5) Hypokalemia


Code(s): E87.6 - HYPOKALEMIA   Status: Acute   





(6) SHANNAN (acute kidney injury)


Code(s): N17.9 - ACUTE KIDNEY FAILURE, UNSPECIFIED   Status: Acute   Comment: 

Resolved with IVF and Holding of lasix.   





(7) Acute respiratory failure with hypoxia


Code(s): J96.01 - ACUTE RESPIRATORY FAILURE WITH HYPOXIA   Status: Acute   

Comment: Secondary to #1, O2 supplementation   





(8) Chronic anticoagulation


Code(s): Z79.01 - LONG TERM (CURRENT) USE OF ANTICOAGULANTS   Status: Chronic   

Comment: Continue Coumadin after DEMETRIO/CV   





(9) Atrial fibrillation


Code(s): I48.91 - UNSPECIFIED ATRIAL FIBRILLATION   Status: Acute   





(10) HTN (hypertension)


Code(s): I10 - ESSENTIAL (PRIMARY) HYPERTENSION   Status: Chronic   


Qualifiers: 


   Hypertension type: essential hypertension   Qualified Code(s): I10 - 

Essential (primary) hypertension   


Comment: Resume home BP regimen when tolerating po intake, serial monitoring   





(11) Constipation


Code(s): K59.00 - CONSTIPATION, UNSPECIFIED   Status: Acute   





(12) Nausea and vomiting


Code(s): R11.2 - NAUSEA WITH VOMITING, UNSPECIFIED   Status: Acute   





(13) Hypomagnesemia


Code(s): E83.42 - HYPOMAGNESEMIA   Status: Acute   





- Plan


Continue current treatments


-: for cardiac cath tomorrow.


-: [place] oral intake advised. start mucomyst given resolving SHANNAN.


-: Monitor electrolytes and replete as needed.





* .

## 2019-06-30 NOTE — PDOC.CTH
Cardiology Progress Note





- Subjective





No new issues. No chest pain. Remains in sinus. 





- Objective


 Vital Signs











  Temp Pulse Resp BP Pulse Ox


 


 06/30/19 11:01  98.6 F    


 


 06/30/19 08:02      99


 


 06/30/19 08:01   92  21 H   99


 


 06/30/19 08:00      96


 


 06/30/19 07:21  98.9 F    


 


 06/30/19 04:00  97.9 F  98  24 H  133/90  95


 


 06/30/19 00:06   95  22 H   98








 











Weight                         173 lb 9.6 oz














 











 06/29/19 06/30/19 07/01/19





 06:59 06:59 06:59


 


Intake Total 890.8 2004 


 


Output Total 600 1110 


 


Balance 290.8 894 














- Physical Examination


General/Neuro: alert & oriented x3, NAD


Neck: no JVD present


Lungs: CTA, unlabored respirations


Heart: RRR


Abdomen: NT/ND


Extremities: other: (no edema)





- Telemetry


Telemetry Rhythm: NSR





- Labs


Result Diagrams: 


 06/30/19 04:35





 06/30/19 04:35


 Troponin/CKMB











CK-MB (CK-2)  2.9 ng/mL (0-6.6)   06/26/19  19:29    


 


Troponin I  0.204 ng/mL (< 0.028)  H  06/26/19  19:29    














- Assessment/Plan





1. Afib RVR, s/p DCCV remains in sinus. 


2. S/P DEMETRIO DCCV


3. Worsening LV function, possible apical ballooning syndrome. EF 10-15% with 

apical akinesis.


4. PPM in situ


5. Acute systolic heart failure, flash pulmonary edema. Resolved.


6. SHANNAN in CKD likely form over diureses resolved with IV fluids. 








PLAN:


- Continue to hold warfarin in preparation for a [facility] Monday.


- Amiodarone PO. 


- For [facility] tomorrow by Dr. Isidro. 


- We spoke about risks and benefits, risk included but not limited to stroke, MI

, death, bleeding and need for blood transfusion, limb loss, organ loss. 

emergent bypass surgery, she agrees to proceed.

## 2019-06-30 NOTE — PRG
DATE OF SERVICE:  06/30/2019



SUBJECTIVE:  This morning, she is better.  She is less short of breath, less cough,

less wheezing. 



OBJECTIVE:  VITAL SIGNS:  Blood pressure is 130/74, saturations are 99% on 2 L,

respirations 21, temperature 98.  I's and O's have been good. 

CHEST:  Decreased breath sounds.  No wheezing. 

CARDIAC:  Normal S1 and S2.  No gallops. 

ABDOMEN:  No masses.



LABORATORY DATA:  White count 11,000, H and H are 11 and 38.  Lytes are normal.



ASSESSMENT:  

1. Congestive heart failure.

2. Supraventricular tachycardia.

3. Bilateral pleural effusions.

4. She is stable.



PLAN:  

1. Proceeding with a catheter on Monday.

2. We will follow.







Job ID:  [id number]

## 2019-07-01 NOTE — PDOC.CTH
Cardiology Progress Note





- Subjective








EP PROGRESS NOTE: 7/1/19





Seen as follow up for atrial arrhythmias. Remianed stable over the weekend on 

Po amiodarone.  Off bipap. breathing good. No heart racing/palpitations.








- Objective


 Vital Signs











  Temp Pulse Resp Pulse Ox


 


 07/01/19 11:14  97.3 F L   


 


 07/01/19 07:52     100


 


 07/01/19 07:16   84  17  100


 


 07/01/19 07:15  97.0 F L   


 


 07/01/19 04:00  97.7 F   








 











Weight                         173 lb 11.2 oz











138/70, 90


 











 06/30/19 07/01/19 07/02/19





 06:59 06:59 06:59


 


Intake Total 2004 1300 


 


Output Total 1110 850 


 


Balance 894 450 














- Physical Examination


General/Neuro: alert & oriented x3, NAD


Neck: no JVD present


Lungs: CTA


Heart: RRR


Abdomen: no HSM


Extremities: + edema B (0)





- Labs


Result Diagrams: 


 07/01/19 04:01





 07/01/19 04:01


 Troponin/CKMB











CK-MB (CK-2)  2.9 ng/mL (0-6.6)   06/26/19  19:29    


 


Troponin I  0.204 ng/mL (< 0.028)  H  06/26/19  19:29    














- Assessment/Plan





1. Atrial fibrillation


   - s/p DEMETRIO/CV on 6/25. Remains in SR through this AM. Now in sinus rhythm


   - OAC with warfarin, continue


   - continue amiodarone PO taper


2. Systolic/Diastolic heart failure 


   - Flash pulmonary edema 6/26/19


   -resolved with lasix and bipap currently on NC


4. SSS/Pacemaker in situ - adequate fx





5. Severely reduced LVEF 20-25% by echo 6/26/19





6. Hypomagnesemia


   -1.1 this AM. 2 grams IV with recheck after infusion 





Cardiology has tentative plans [facility] 671/19. Heart rhythm stable.

## 2019-07-01 NOTE — PDOC.CTH
Cardiology Progress Note





- Subjective





The pt seen and examined.  No overnight events.  No cardiac complaints.





- Objective


 Vital Signs











  Temp Pulse Resp Pulse Ox


 


 07/01/19 11:14  97.3 F L   


 


 07/01/19 07:52     100


 


 07/01/19 07:16   84  17  100


 


 07/01/19 07:15  97.0 F L   


 


 07/01/19 04:00  97.7 F   








 











Weight                         173 lb 11.2 oz














 











 06/30/19 07/01/19 07/02/19





 06:59 06:59 06:59


 


Intake Total 2004 1300 


 


Output Total 1110 850 


 


Balance 894 450 














- Physical Examination


General/Neuro: alert & oriented x3


Neck: no JVD present


Lungs: CTA


Heart: RRR


Abdomen: soft


Extremities: other: (No edema)





- Telemetry


Telemetry Rhythm: SR





- Labs


Result Diagrams: 


 07/01/19 04:01





 07/01/19 04:01


 Troponin/CKMB











CK-MB (CK-2)  2.9 ng/mL (0-6.6)   06/26/19  19:29    


 


Troponin I  0.204 ng/mL (< 0.028)  H  06/26/19  19:29    














- Assessment/Plan





1. Afib RVR with s/p DCCV on 06/25/2019 - remains in SR;On Amiodarone 200mg BID

; Plan for [facility] today; will resume Coumadin after cath


2. Acute on Chronic Systolic HF with EF 20-25% - stale; may resume Diuretic and 

bblocker; on losartan 100mg qd


3. HTN - stable


4. PPM in situ 


MAR reviewed





Echo on 06/26/2019 with EF 20-25%, mild dilated LA, mild MR, mild TR, PVSP 

60mmHg, and dilated IVC





Pt. seen and eval. by me this AM. No cardiac complaints. Chest clear. RRR. This 

afternoon the cardiac cathn was performed. She has normal coronaries but severe 

decr. in LV syst. fx. The apex is akinetic, dyskinetic. This afternoon she was 

backmin atrial fib. I started IV dilt. for rate control. Continue po 

amiodarone. 








Review of Systems





- Review of Systems


Constitutional: reports: no symptoms reported


EENTM: reports: no symptoms reported


Respiratory: reports: no symptoms reported


Cardiac (ROS): reports: no symptoms reported


ABD/GI: reports: no symptoms reported


: reports: no symptoms reported


Musculoskeletal: reports: no symptoms reported

## 2019-07-01 NOTE — PRG
DATE OF SERVICE:  07/01/2019



SUBJECTIVE:  This morning awake, alert, responsive, and less short of breath.



OBJECTIVE:  VITAL SIGNS:  Saturations are 100% on 2 L, temperature 97, pulse

__________, blood pressure 141/79. 

CHEST:  No wheezing. 

CARDIAC:  Normal S1 and S2.  No gallops. 

ABDOMEN:  No masses.



LABORATORY DATA:  White count 9000.  Lytes are normal.



IMAGING STUDIES:  X-ray shows a small bowel pleural effusion.



IMPRESSION:  Congestive heart failure, bilateral pleural effusions, respiratory

failure, supraventricular tachycardia. 



PLAN:  She is due for a __________ today.  Pulmonary will follow while in the MICU.

Supportive care. 







Job ID:  [id number]

## 2019-07-01 NOTE — PDOC.PN
- Subjective


Encounter Start Date: 07/01/19


Encounter Start Time: 11:05





Patient seen and examined, no new issues.





- Objective


Vital Signs & Weight: 


 Vital Signs (12 hours)











  Temp Pulse Resp Pulse Ox


 


 07/01/19 07:52     100


 


 07/01/19 07:16   84  17  100


 


 07/01/19 07:15  97.0 F L   


 


 07/01/19 04:00  97.7 F   


 


 07/01/19 00:06   95  23 H  100


 


 07/01/19 00:00  98.9 F   








 Weight











Weight                         173 lb 11.2 oz











 Most Recent Monitor Data











Heart Rate from ECG            82


 


NIBP                           138/70


 


NIBP BP-Mean                   92


 


Respiration from ECG           17


 


SpO2                           100














I&O: 


 











 06/30/19 07/01/19 07/02/19





 06:59 06:59 06:59


 


Intake Total 2004 1300 


 


Output Total 1110 850 


 


Balance 894 450 











Result Diagrams: 


 07/01/19 04:01





 07/01/19 04:01





Phys Exam





- Physical Examination


Constitutional: NAD


HEENT: PERRLA, moist MMs, sclera anicteric


Neck: no nodes, no JVD, supple


Respiratory: no wheezing, no rales, no rhonchi


Cardiovascular: RRR, no significant murmur, no rub


Gastrointestinal: soft, non-tender, no distention, positive bowel sounds


Musculoskeletal: no edema, pulses present





Dx/Plan


(1) Acute on chronic systolic CHF (congestive heart failure)


Code(s): I50.23 - ACUTE ON CHRONIC SYSTOLIC (CONGESTIVE) HEART FAILURE   Status

: Acute   Comment: EF 20-25%, continue Lasix but decrease dose due to 

hypotension, continue Spironolactone, Losartan   





(2) Atrial fibrillation with RVR


Code(s): I48.91 - UNSPECIFIED ATRIAL FIBRILLATION   Status: Acute   Comment: s/

p DEMETRIO/CV today, continue anticoagulation, rate-control measures, SR currently, 

continues on Amiodarone gtt   





(3) Constipation


Code(s): K59.00 - CONSTIPATION, UNSPECIFIED   Status: Acute   





(4) Anxiety


Code(s): F41.9 - ANXIETY DISORDER, UNSPECIFIED   Status: Chronic   





(5) HTN (hypertension)


Code(s): I10 - ESSENTIAL (PRIMARY) HYPERTENSION   Status: Chronic   


Qualifiers: 


   Hypertension type: essential hypertension   Qualified Code(s): I10 - 

Essential (primary) hypertension   


Comment: Resume home BP regimen when tolerating po intake, serial monitoring   





- Plan





* cath for today


* labs in AM


* monitor in ICU for now


* case and plan d/w patient and family at length, they understood and agreed 

with this plan.

## 2019-07-01 NOTE — RAD
CHEST 1 VIEW:

 

HISTORY: 

Congestive heart failure.

 

COMPARISON: 

6/27/2019.

 

FINDINGS: 

Portable semiupright chest demonstrates a stable transvenous pacemaker.  There is atherosclerosis of 
the aorta and cardiomegaly.  Pulmonary vessels are prominent.  There are bilateral pleural effusions 
with adjacent parenchymal change.  Degree of opacification is stable.  No pneumothorax.  

 

IMPRESSION: 

No significant interval change.

 

POS: OFF

## 2019-07-02 NOTE — PRG
DATE OF SERVICE:  07/02/2019



SUBJECTIVE:  Danni Ortiz, this morning, is awake, alert, responsive.  No

shortness of breath.  She is status post cardiac cath. 



OBJECTIVE:  VITAL SIGNS:  Saturations are 96% on 2 L, respirations 21, temperature

97, and blood pressure 119/56. 

CHEST:  No wheezing or crackles. 

CARDIAC:  Normal S1 and S2.  No gallops.



IMPRESSION:  

1. Congestive heart failure.

2. Supraventricular tachycardia.



PLAN:  Disposition as per Cardiology.  Pulmonary will follow while in the MICU.







Job ID:  [id number]

## 2019-07-02 NOTE — PDOC.CTH
Cardiology Progress Note





- Subjective





EP PROGRESS NOTE: 7/2/19





Seen as follow up for atrial arrhythmias. Remained stable over the weekend on 

Po amiodarone but back in AF this AM.  Off bipap. breathing good. 





- Objective


 Vital Signs











  Temp Pulse Resp Pulse Ox


 


 07/02/19 10:29  97.0 F L   


 


 07/02/19 08:00     94 L


 


 07/02/19 07:16   79  21 H  96


 


 07/02/19 07:11  97.1 F L   


 


 07/02/19 05:51   90  


 


 07/02/19 03:47  97.2 F L   








 











Weight                         174 lb 14.4 oz














 











 07/01/19 07/02/19 07/03/19





 06:59 06:59 06:59


 


Intake Total 1300 2097 


 


Output Total 850 1350 


 


Balance 450 747 














- Physical Examination


General/Neuro: alert & oriented x3, NAD


Neck: carotid US brisk, no JVD present


Lungs: CTA, unlabored respirations


Heart: PMI normal


Abdomen: NT/ND, soft





- Telemetry


Telemetry Rhythm: A flutter





- Labs


Result Diagrams: 


 07/03/19 06:16





 07/03/19 06:16


 Troponin/CKMB











CK-MB (CK-2)  2.9 ng/mL (0-6.6)   06/26/19  19:29    


 


Troponin I  0.204 ng/mL (< 0.028)  H  06/26/19  19:29    














- Assessment/Plan








1. Atrial fibrillation


   - s/p DEMETRIO/CV on 6/25. Remained in SR for a number of days, but now back in 

AFL 7/1/19. Amiodarone 400mg PO BID while IP


   - OAC with warfarin, recent INR 1.3 . Continue with lovenox bridge until INR 

2.0-3.0





2. Systolic/Diastolic heart failure 


   - Flash pulmonary edema 6/26/19


   - resolved with lasix and bipap currently on NC


4. SSS/Pacemaker in situ - adequate fx





5. Severely reduced LVEF 20-25% by echo 6/26/19





6. Hypomagnesemia


   -1.1 this AM. 2 grams IV with recheck after infusion 





LHC revealed normal coronary arteries. 





Possible CV with Dr Sow tomorrow.

## 2019-07-02 NOTE — PDOC.PN
- Subjective


Encounter Start Date: 07/02/19


Encounter Start Time: 11:28





Patient seen and examined, no new issues.





- Objective


Vital Signs & Weight: 


 Vital Signs (12 hours)











  Temp Pulse Resp Pulse Ox


 


 07/02/19 10:29  97.0 F L   


 


 07/02/19 07:16   79  21 H  96


 


 07/02/19 07:11  97.1 F L   


 


 07/02/19 05:51   90  


 


 07/02/19 03:47  97.2 F L   


 


 07/02/19 00:04   107 H  21 H  96


 


 07/01/19 23:57  98.3 F   








 Weight











Weight                         174 lb 14.4 oz











 Most Recent Monitor Data











Heart Rate from ECG            86


 


NIBP                           130/77


 


NIBP BP-Mean                   94


 


Respiration from ECG           24


 


SpO2                           97














I&O: 


 











 07/01/19 07/02/19 07/03/19





 06:59 06:59 06:59


 


Intake Total 1300 2097 


 


Output Total 850 1350 


 


Balance 450 747 











Result Diagrams: 


 07/02/19 05:02





 07/02/19 05:02


Additional Labs: 


 Accuchecks











  07/01/19





  05:34


 


POC Glucose  122 H














Phys Exam





- Physical Examination


Constitutional: NAD


HEENT: PERRLA, moist MMs


Neck: no nodes, no JVD


Respiratory: no wheezing, no rales, no rhonchi


Cardiovascular: RRR, no significant murmur, no rub


Gastrointestinal: soft, non-tender, no distention


Musculoskeletal: no edema, pulses present





Dx/Plan


(1) Acute on chronic systolic CHF (congestive heart failure)


Code(s): I50.23 - ACUTE ON CHRONIC SYSTOLIC (CONGESTIVE) HEART FAILURE   Status

: Acute   Comment: EF 20-25%, continue Lasix but decrease dose due to 

hypotension, continue Spironolactone, Losartan   





(2) Atrial fibrillation with RVR


Code(s): I48.91 - UNSPECIFIED ATRIAL FIBRILLATION   Status: Acute   Comment: s/

p DEMETRIO/CV today, continue anticoagulation, rate-control measures, SR currently, 

continues on Amiodarone gtt   





(3) Constipation


Code(s): K59.00 - CONSTIPATION, UNSPECIFIED   Status: Acute   





(4) Anxiety


Code(s): F41.9 - ANXIETY DISORDER, UNSPECIFIED   Status: Chronic   





(5) HTN (hypertension)


Code(s): I10 - ESSENTIAL (PRIMARY) HYPERTENSION   Status: Chronic   


Qualifiers: 


   Hypertension type: essential hypertension   Qualified Code(s): I10 - 

Essential (primary) hypertension   


Comment: Resume home BP regimen when tolerating po intake, serial monitoring   





- Plan





* cath for today


* cont current plan of care for now


* monitor in ICU


* labs in AM


* case and plan d/w patient at length, she understood and agreed with this 

plan.

## 2019-07-02 NOTE — PDOC.CTH
Cardiology Progress Note





- Subjective





pt. seen and eval. by me. She denies any cardiac complaints. Comfortable. 

Continues in Afib. Rate controlled.





- Objective


 Vital Signs











  Temp Pulse Resp Pulse Ox


 


 07/02/19 10:29  97.0 F L   


 


 07/02/19 07:16   79  21 H  96


 


 07/02/19 07:11  97.1 F L   


 


 07/02/19 05:51   90  


 


 07/02/19 03:47  97.2 F L   


 


 07/02/19 00:04   107 H  21 H  96


 


 07/01/19 23:57  98.3 F   


 


 07/01/19 23:16   114 H  








 











Weight                         174 lb 14.4 oz














 











 07/01/19 07/02/19 07/03/19





 06:59 06:59 06:59


 


Intake Total 1300 2097 


 


Output Total 850 1350 


 


Balance 450 747 














- Physical Examination


General/Neuro: alert & oriented x3


Neck: no JVD present


Lungs: CTA


Heart: other: (irreg/irreg)


Abdomen: NT/ND, soft





- Labs


Result Diagrams: 


 07/02/19 05:02





 07/02/19 05:02


 Troponin/CKMB











CK-MB (CK-2)  2.9 ng/mL (0-6.6)   06/26/19  19:29    


 


Troponin I  0.204 ng/mL (< 0.028)  H  06/26/19  19:29    














- Assessment/Plan





1. Afib RVR with s/p DCCV on 06/25/2019 - remains in Afib since yesterday prior 

to the cardiac cath. rate is reasonably well controlled.;On Amiodarone 200mg BID

, I will increase to 400 biday., she is also on diltiazem and digoxin for rate 

controlled; Plan for cardioversion in AM if she does not convert.; Coumadin was 

started after cath. lovenox will be given until the INR is > 2.0


2. Acute on Chronic Systolic HF with EF 20-25%. review of the echoes on 

admission and after the cardioversion indicate that the LV apex was not normal 

but appeared worse after cardioversion. The etiology is unknown.The cardiac 

cath yesterday revealed normal coronaries. - stable; continue Diuretic and 

bblocker; on losartan 100mg qd


3. HTN - stable


4. PPM in situ 


MAR reviewed

## 2019-07-03 NOTE — PRG
DATE OF SERVICE:  07/03/2019



SUBJECTIVE:  This morning, awake, alert, and responsive, status post cardioversion.



OBJECTIVE:  VITAL SIGNS:  Temperature 97, sats 100%, respirations 20, pulse 77, and

blood pressure 160/85. 

CHEST:  Decreased breath sounds.  No wheezing. 

CARDIAC:  Normal S1 and S2.  No gallops. 

ABDOMEN:  No masses.



LABORATORY DATA:  Labs are unremarkable.



IMPRESSION:  Recurrent supraventricular tachycardia, status post cardioversion and

congestive heart failure. 



PLAN:  Pulmonary wise, disposition as per Cardiology.  Cough much improved. 



We will follow while in the MICU.







Job ID:  [id number]

## 2019-07-03 NOTE — PRG
DATE OF SERVICE:  07/03/2019



SUBJECTIVE:  The patient is seen and examined at the bedside.  She feels

significantly better now.  She just came back from her cardioversion.  Her heart

rate is back to normal sinus rhythm. 



OBJECTIVE:  VITAL SIGNS:  Blood pressure is 176/74, pulse is 71, respiratory rate is

17, O2 saturation is 94% on 2 L by nasal cannula. 

HEENT:  Her head is atraumatic, normocephalic.  Eyes are PERRLA.  Sclerae are

nonicteric.  Oral mucosa is moist. 

NECK:  Supple. 

LUNGS:  Breath sounds diminished at both bases. 

HEART:  S1, S2.  Regular.  No S3.  No S4. 

ABDOMEN:  Soft, nontender. 

EXTREMITIES:  1+ peripheral edema similar bilaterally. 

NEUROLOGICAL:  She is alert and oriented x3.  There is no any motor or sensory

deficit. 



LABORATORY DATA:  Labs showed white count of 8.0, hemoglobin 11.3, hematocrit 37.6,

platelet count is 391,000.  Chemistry normal.  Electrolytes; BUN of 7, creatinine of

0.65, glycemia is ranging from 129 to 144, calcium 9.2.  Her INR is 1.3.  PT is

16.4. 



IMPRESSION AND PLAN:  

1. Atrial fibrillation.  The patient was just cardioverted on this morning.  She is

on oral amiodarone.  We will continue her current regimen.  She is still on Cardizem

7.5 mg/hour drip. 

2. Systolic and diastolic heart failure with flash pulmonary edema, improved.

3. Chronic anticoagulation with warfarin.  INR is still subtherapeutic.  We are

going to bridge with heparin.  She is on Lovenox, low molecular weight heparin. 

4. Pace in situ.

5. Cardiomyopathy with an estimated LVEF at 20% to 25% on recent echo.  Apparently,

she has normal coronary arteries on the previous left heart catheterization.  She is

to get the LifeVest and she should be able to go home in the next probably 48 hours. 





Job ID:  [id number]

## 2019-07-03 NOTE — PDOC.CTH
Cardiology Progress Note





- Subjective





Pt. seen and eval. by me. No new complaints. Minimal expiratory wheeze this AM.





- Objective


 Vital Signs











  Temp Pulse Resp Pulse Ox


 


 07/03/19 07:28     98


 


 07/03/19 07:19  97.2 F L   


 


 07/03/19 04:00  98.3 F   


 


 07/03/19 00:00  98.6 F   


 


 07/02/19 23:09   107 H  20  97








 











Weight                         178 lb 1.6 oz














 











 07/02/19 07/03/19 07/04/19





 06:59 06:59 06:59


 


Intake Total 2097 890 


 


Output Total 1350 500 


 


Balance 747 390 














- Physical Examination


General/Neuro: alert & oriented x3


Neck: no JVD present


Lungs: other: (minimal wheeze.)


Heart: other: (irreg/irreg.)


Abdomen: other: (distended,tympanic. Abd. bloating.)


Extremities: other: (bilateral forearm thrombophlebitis. No LE edema.)





- Telemetry


Telemetry Rhythm: Afib.





- Labs


Result Diagrams: 


 07/03/19 06:16





 07/03/19 06:16


 Troponin/CKMB











CK-MB (CK-2)  2.9 ng/mL (0-6.6)   06/26/19  19:29    


 


Troponin I  0.204 ng/mL (< 0.028)  H  06/26/19  19:29    














- Assessment/Plan





1. Afib RVR with s/p DCCV on 06/25/2019 - remains in Afib since  prior to the 

cardiac cath. rate is reasonably well controlled.;On Amiodarone 400mg BID, I 

will attempt cardioversion today.She is also on diltiazem and digoxin for rate 

controlled; Will stop digoxin post cardioversion and change IV diltiazem to po. 

Coumadin was started after cath. lovenox will be given until the INR is > 2.0


2. Acute on Chronic Systolic HF with EF 20-25%. review of the echoes on 

admission and after the cardioversion indicate that the LV apex was not normal 

but appeared worse after cardioversion. The etiology is unknown.The cardiac 

cath yesterday revealed normal coronaries. - stable; continue Diuretic and 

bblocker; on losartan 100mg qd. She will need a Life-Vest on d/c.


3. HTN - stable


4. PPM in situ 


MAR reviewed

## 2019-07-03 NOTE — OP
DATE OF PROCEDURE: 

7/3/19

 

INDICATION FOR PROCEDURE:

This is an 78-year-old female who has history of intermittent atrial fibrillation, severe cardiomyopa
thy. Had been earlier cardioverted and converted back to atrial fibrillation. She has been placed on 
oral antiarrhythmic medication. She was advised to undergo repeat cardiac catheterization to increase
 her cardiac output. 

 

She was taken to the recovery area where she underwent short acting propofol

using one attempt at 200 joules, she was successfully converted from her atrial fibrillation back to 
normal sinus rhythm. She also has a pacemaker and this was interrogated and was found to have normal 
function. There were no complications or difficulties encountered. 

 

IMPRESSION: 

Successful cardioversion of atrial fibrillation back to sinus rhythm with one attempt at 200 joules.

## 2019-07-03 NOTE — PDOC.CTH
Cardiology Progress Note





- Subjective





EP PROGRESS NOTE: 7/3/19





Seen as follow up for atrial arrhythmias. Having nausea with higher amio dose 

today vs taking pills while NPO





- Objective


 Vital Signs











  Temp Pulse Pulse BP BP Pulse Ox Pulse Ox


 


 07/03/19 10:36  97.8 F      


 


 07/03/19 10:12   74  76  159/56 H  168/65 H   100


 


 07/03/19 09:43  97.8 F      


 


 07/03/19 07:28       98 


 


 07/03/19 07:19  97.2 F L      


 


 07/03/19 04:00  98.3 F      














  Pulse Ox


 


 07/03/19 10:36 


 


 07/03/19 10:12  100


 


 07/03/19 09:43 


 


 07/03/19 07:28 


 


 07/03/19 07:19 


 


 07/03/19 04:00 








 











Weight                         178 lb 1.6 oz














 











 07/02/19 07/03/19 07/04/19





 06:59 06:59 06:59


 


Intake Total 2097 890 


 


Output Total 1350 500 


 


Balance 747 390 














- Physical Examination


General/Neuro: alert & oriented x3, NAD


Neck: carotid US brisk, no JVD present


Lungs: other: (BL wheezes)


Heart: PMI normal, RRR


Abdomen: NT/ND, soft





- Telemetry


Telemetry Rhythm: SR





- Labs


Result Diagrams: 


 07/03/19 06:16





 07/03/19 06:16


 Troponin/CKMB











CK-MB (CK-2)  2.9 ng/mL (0-6.6)   06/26/19  19:29    


 


Troponin I  0.204 ng/mL (< 0.028)  H  06/26/19  19:29    














- Assessment/Plan








1. Atrial fibrillation


   - s/p DEMETRIO/CV on 6/25. Remained in SR for a number of days, but now back in 

AFL 7/1/19. CV 7/3 --> SR


   - Amiodarone 400mg PO BID while IP if tolerated. 


   - OAC with warfarin. Continue with lovenox bridge until INR 2.0-3.0


   - Agree with stopping digoxin with increased amiodarone dose


   - Dilt gtt changed to PO 





2. Systolic/Diastolic heart failure 


   - Flash pulmonary edema 6/26/19


   - resolved with lasix and bipap currently on NC


4. SSS/Pacemaker in situ - adequate fx





5. Severely reduced LVEF 20-25% by echo 6/26/19





6. Hypomagnesemia


   -1.6.  Would prefer replacement to 2.0   Additional 2grams IVPB today then 

recheck Mag in AM





[facility] revealed normal coronary arteries. 








Continue PO amiodarone and PO diltiazem.  Warfarin subtherapeutic but bridged 

with lovenox until INR >2. Replacing magnesium. EP signing off. 





Amiodarone taper recommendations for discharge:





200mg PO TID x 2 weeks 


200mg PO BID x 2 weeks


200mg PO daily thereafter

## 2019-07-04 NOTE — PRG
DATE OF SERVICE:  07/04/2019



SUBJECTIVE:  This morning, status post cardioversion.



OBJECTIVE:  VITAL SIGNS:  Temperature 97, pulse 72, blood pressure 163/69,

saturation 100%, and respiratory rate 18. 

Chest:  No wheezing or crackles. 

CARDIAC:  Normal S1 and S2.  No gallop. 

ABDOMEN:  No masses.



LABORATORY DATA:  White count 7000.  Lytes are normal.



ASSESSMENT AND PLAN:  Congestive heart failure, status post cardioversion.  The

patient is improved.  She can be transferred out of the MICU. 







Job ID:  [id number]

## 2019-07-04 NOTE — PDOC.PN
- Subjective


Encounter Start Date: 07/04/19


Encounter Start Time: 14:18





Patient seen and examined, no new issues. 





- Objective


Vital Signs & Weight: 


 Vital Signs (12 hours)











  Temp Pulse Resp Pulse Ox


 


 07/04/19 12:55   75  20 


 


 07/04/19 10:41  97.3 F L   


 


 07/04/19 07:45     98


 


 07/04/19 07:22     97


 


 07/04/19 07:20   80  19  96


 


 07/04/19 07:05  97.0 F L   


 


 07/04/19 04:37  97.1 F L   








 Weight











Admit Weight                   195 lb 1.745 oz


 


Weight                         180 lb











 Most Recent Monitor Data











Heart Rate from ECG            72


 


NIBP                           167/64


 


NIBP BP-Mean                   98


 


Respiration from ECG           22


 


SpO2                           100














I&O: 


 











 07/03/19 07/04/19 07/05/19





 06:59 06:59 06:59


 


Intake Total 890 240 


 


Output Total 500 550 


 


Balance 390 -310 











Result Diagrams: 


 07/04/19 05:34





 07/04/19 05:34


Additional Labs: 


 Accuchecks











  07/04/19 07/03/19





  05:32 20:58


 


POC Glucose  127 H  125 H














Phys Exam





- Physical Examination


Constitutional: NAD


HEENT: PERRLA, moist MMs, sclera anicteric


Neck: no nodes, no JVD, supple


Respiratory: no wheezing, no rales, no rhonchi


Cardiovascular: RRR, no significant murmur, no rub


Gastrointestinal: soft, non-tender, no distention, positive bowel sounds


Musculoskeletal: pulses present, edema present (trace)





Dx/Plan


(1) Acute on chronic systolic CHF (congestive heart failure)


Code(s): I50.23 - ACUTE ON CHRONIC SYSTOLIC (CONGESTIVE) HEART FAILURE   Status

: Acute   Comment: EF 20-25%, continue Lasix but decrease dose due to 

hypotension, continue Spironolactone, Losartan   





(2) Atrial fibrillation with RVR


Code(s): I48.91 - UNSPECIFIED ATRIAL FIBRILLATION   Status: Acute   Comment: s/

p DEMETRIO/CV today, continue anticoagulation, rate-control measures, SR currently, 

continues on Amiodarone gtt   





(3) Constipation


Code(s): K59.00 - CONSTIPATION, UNSPECIFIED   Status: Acute   





(4) Anxiety


Code(s): F41.9 - ANXIETY DISORDER, UNSPECIFIED   Status: Chronic   





(5) HTN (hypertension)


Code(s): I10 - ESSENTIAL (PRIMARY) HYPERTENSION   Status: Chronic   


Qualifiers: 


   Hypertension type: essential hypertension   Qualified Code(s): I10 - 

Essential (primary) hypertension   


Comment: Resume home BP regimen when tolerating po intake, serial monitoring   





- Plan





* cont current plan of care


* labs in AM


* cardio following


* DC plans once cleared by subspecialists


* case and plan d/w patient at length, she understood and agreed with this 

plan.

## 2019-07-04 NOTE — PDOC.CTH
Cardiology Progress Note





- Objective


 Vital Signs











  Temp Pulse Resp Pulse Ox


 


 07/04/19 10:41  97.3 F L   


 


 07/04/19 07:45     98


 


 07/04/19 07:22     97


 


 07/04/19 07:20   80  19  96


 


 07/04/19 07:05  97.0 F L   


 


 07/04/19 04:37  97.1 F L   








 











Admit Weight                   195 lb 1.745 oz


 


Weight                         180 lb














 











 07/03/19 07/04/19 07/05/19





 06:59 06:59 06:59


 


Intake Total 890 240 


 


Output Total 500 550 


 


Balance 390 -310 














- Physical Examination


General/Neuro: alert & oriented x3


Neck: no JVD present


Lungs: CTA


Heart: RRR


Abdomen: NT/ND, soft





- Labs


Result Diagrams: 


 07/04/19 05:34





 07/04/19 05:34


 Troponin/CKMB











CK-MB (CK-2)  2.9 ng/mL (0-6.6)   06/26/19  19:29    


 


Troponin I  0.204 ng/mL (< 0.028)  H  06/26/19  19:29    














- Assessment/Plan





1. Afib RVR with s/p DCCV on 06/25/2019 - remains inNSR after cardioversion 

yesterday. On Amiodarone 400mg BID. She is also on diltiazem.  change IV 

diltiazem to po. Coumadin was started after cath. lovenox will be given until 

the INR is > 2.0


2. Acute on Chronic Systolic HF with EF 20-25%. review of the echoes on 

admission and after the cardioversion indicate that the LV apex was not normal 

but appeared worse after cardioversion. The etiology is unknown.The cardiac 

cath yesterday revealed normal coronaries. - stable; continue Diuretic and 

bblocker; on losartan 100mg qd. She will need a Life-Vest on d/c. This was 

being fitted today.


3. HTN - stable


4. PPM in situ 


MAR reviewed


Probably home in 1-2 days.Hopefully the EF will improve.  Picture of [place]

## 2019-07-05 NOTE — PRG
DATE OF SERVICE:  07/05/2019



SUBJECTIVE:  Danni Ortiz was sitting up in a bedside chair.  She said that she

was hopefully being discharged.  Her vital signs were stable.  She was in no

distress.  She denied shortness of breath. 



OBJECTIVE:  LUNGS:  Clear. 

HEART:  Regular rhythm. 

ABDOMEN:  Soft. 

EXTREMITIES:  Without edema. 



Cardiology is still following her.



IMPRESSION:  

1. Atrial fibrillation with rapid ventricular response, status post cardioversion,

now in sinus rhythm, on amiodarone. 

2. Anemia with stable hemoglobin 11.6 today.

3. Systolic heart failure with an ejection fraction of 20%.  She had normal

coronaries in July of this year. 

4. Hypertension. 

There is a concern that she may have takotsubo cardiomyopathy. 



She is tentatively scheduled to follow up with Dr. Isidro in a couple of weeks. 



She is stable from a pulmonary standpoint.  We will sign off.







Job ID:  [id number]

## 2019-07-05 NOTE — PDOC.PN
- Subjective


Encounter Start Date: 07/05/19


Encounter Start Time: 14:59





Patient seen and examined, no new issues or complaints. 





- Objective


Vital Signs & Weight: 


 Vital Signs (12 hours)











  Temp Pulse Resp BP Pulse Ox


 


 07/05/19 12:54   76  19  


 


 07/05/19 12:00      95


 


 07/05/19 11:07  98.0 F    


 


 07/05/19 10:16     172/86 H 


 


 07/05/19 08:19      96


 


 07/05/19 07:58   93  22 H   96


 


 07/05/19 07:31  98.4 F    


 


 07/05/19 03:54  98.4 F    








 Weight











Admit Weight                   195 lb 1.745 oz


 


Weight                         180 lb











 Most Recent Monitor Data











Heart Rate from ECG            86


 


NIBP                           116/50


 


NIBP BP-Mean                   72


 


Respiration from ECG           13


 


SpO2                           96














I&O: 


 











 07/04/19 07/05/19 07/06/19





 06:59 06:59 06:59


 


Intake Total 240 60 


 


Output Total 550 450 100


 


Balance -310 -390 -100











Result Diagrams: 


 07/05/19 04:42





 07/05/19 04:42





Phys Exam





- Physical Examination


Constitutional: NAD


HEENT: PERRLA, moist MMs, sclera anicteric


Neck: no nodes, no JVD, supple


Respiratory: no wheezing, no rales, no rhonchi


Cardiovascular: RRR, no significant murmur, no rub


Gastrointestinal: soft, non-tender, no distention, positive bowel sounds


Musculoskeletal: pulses present, edema present (trace)





Dx/Plan


(1) Acute on chronic systolic CHF (congestive heart failure)


Code(s): I50.23 - ACUTE ON CHRONIC SYSTOLIC (CONGESTIVE) HEART FAILURE   Status

: Acute   Comment: EF 20-25%, continue Lasix but decrease dose due to 

hypotension, continue Spironolactone, Losartan   





(2) Atrial fibrillation with RVR


Code(s): I48.91 - UNSPECIFIED ATRIAL FIBRILLATION   Status: Acute   Comment: s/

p DEMETRIO/CV today, continue anticoagulation, rate-control measures, SR currently, 

continues on Amiodarone gtt   





(3) Constipation


Code(s): K59.00 - CONSTIPATION, UNSPECIFIED   Status: Acute   





(4) Anxiety


Code(s): F41.9 - ANXIETY DISORDER, UNSPECIFIED   Status: Chronic   





(5) HTN (hypertension)


Code(s): I10 - ESSENTIAL (PRIMARY) HYPERTENSION   Status: Chronic   


Qualifiers: 


   Hypertension type: essential hypertension   Qualified Code(s): I10 - 

Essential (primary) hypertension   


Comment: Resume home BP regimen when tolerating po intake, serial monitoring   





- Plan





* cont current plan of care


* monitoring for now for improvement of EF and cardiac function


* labs in AM


* no changes in plan of care

## 2019-07-05 NOTE — PDOC.CTH
Cardiology Progress Note





- Subjective





The pt seen and examined.  No overnight events.  No cardiac complaints.  





- Objective


 Vital Signs











  Temp Pulse Resp BP Pulse Ox


 


 07/05/19 11:07  98.0 F    


 


 07/05/19 10:16     172/86 H 


 


 07/05/19 08:19      96


 


 07/05/19 07:58   93  22 H   96


 


 07/05/19 07:31  98.4 F    


 


 07/05/19 03:54  98.4 F    


 


 07/05/19 00:00  97.6 F    








 











Admit Weight                   195 lb 1.745 oz


 


Weight                         180 lb














 











 07/04/19 07/05/19 07/06/19





 06:59 06:59 06:59


 


Intake Total 240 60 


 


Output Total 550 450 100


 


Balance -310 -390 -100














- Physical Examination


General/Neuro: alert & oriented x3


Neck: no JVD present


Lungs: CTA


Heart: RRR


Abdomen: soft


Extremities: other: (1-2+ non-pitting BLE foot edema)





- Telemetry


Telemetry Rhythm: SR





- Labs


Result Diagrams: 


 07/05/19 04:42





 07/05/19 04:42


 Troponin/CKMB











CK-MB (CK-2)  2.9 ng/mL (0-6.6)   06/26/19  19:29    


 


Troponin I  0.204 ng/mL (< 0.028)  H  06/26/19  19:29    














- Assessment/Plan





1. Afib RVR with s/p DCCV on 06/25/2019 - remains in NSR after cardioversion on 

07/03/2019. On Amiodarone 400mg BID on 07/02/2019.  She is also on diltiazem 

and Coumadin.  Lovenox was d/jorge today with INR is > 2.0 today; 


2. Acute on Chronic Systolic HF with EF 20-25%. review of the echoes on 

admission and after the cardioversion indicate that the LV apex was not normal 

but appeared worse after cardioversion. The etiology is unknown.The cardiac 

cath on 07/01/2019 revealed normal coronaries. Continue Diuretic and bblocker; 

on losartan 100mg qd. She has worn Life-Vest since 07/04/2019.  


3. HTN - resume Nifedifine 90mg qd and Metoprolol 100mg qd from today; 


4. PPM in situ 


MAR reviewed





* The pt may d/c home after stable VS.  


* Hopefully the EF will improve.  Picture of [place]


* The pt will f/u with Dr Isidro' office within 2 wks. 








Pt.seen and eval. by me.I agree with the A/P by the NP. She is fatigued today 

but maintaining NSR. I will repeat the echo in a month to see if the EF has 

improved.





Review of Systems





- Review of Systems


Constitutional: reports: no symptoms reported


EENTM: reports: no symptoms reported


Respiratory: reports: no symptoms reported


Cardiac (ROS): reports: no symptoms reported


ABD/GI: reports: no symptoms reported


: reports: no symptoms reported


Musculoskeletal: reports: no symptoms reported

## 2019-07-06 NOTE — DIS
DATE OF ADMISSION:  06/24/2019



DATE OF DISCHARGE:  07/06/2019



ADMITTING DIAGNOSES:  Chest pain, shortness of breath, hyperlipidemia, 
hypertension,

history of atrial fibrillation, status post ablation and pacemaker placement. 



DISCHARGE DIAGNOSES:  Chest pain, resolved; shortness of breath, resolved; 
atrial

fibrillation, resolved; hypertension, stable; dyslipidemia, stable. 



HOSPITAL COURSE:  This is an 78-year-old female with past medical history of 
atrial

fibrillation, status post ablation in 2007, was admitted for shortness of 
breath.

The patient had a history of pacemaker placement, was admitted to Internal 
Medicine

Team __________ closely by Cardiology, EP, and Pulmonary Teams.  The patient had

thorough evaluation done along with an echocardiogram during her stay here.  The

patient was found to have an EF of 20% to 25%, mild dilated left atrium, annular

calcification, aortic valve sclerosis, and a dilated inferior vena cava.  The

patient was restarted on her anticoagulation.  Also had cardioversion done.  
She was

started on amiodarone post cardioversion, was seen by EP and recommendations 
were to

200 mg of amiodarone t.i.d. for 2 weeks, 200 mg amiodarone b.i.d. for 2 weeks, 
and

then 200 mg daily thereafter.  The patient was also discharged with warfarin to

follow up with the cardiologist for INR checks and management of Coumadin 
dosage.

The patient at point in time of discharge was stable.  Denied any nausea, 
vomiting,

diarrhea, constipation, chest pain, fevers, or shortness of breath.  Case and 
plan

discussed with the patient and family at length.  They understood and agreed 
with

this plan. 



DISPOSITION:  Home with home health care.



FOLLOWUP:  Follow up with PCP in 1 week and Cardiology within 1 week.



MEDICATIONS:  See MAR.



ACTIVITY:  As tolerated with assistance as needed.



DIET:  Low-fat, low-calorie, high-fiber diet.



CONDITION:  Stable.



PROGNOSIS:  Good. 



Case and plan once again discussed with the patient and family at length.  They

understood and agreed with this plan. 







Job ID:  [id number]



MTDD

## 2019-07-10 NOTE — OP
DATE OF PROCEDURE: 

7/3/19

 

INDICATION FOR PROCEDURE:

This is an 78-year-old female who had history of atrial fibrillation and underwent cardioversion, but
 then converted back to atrial fibrillation. She had been placed on medical management. 

 

 

CARDIOVERSION

 

The patient was taken to the recovery area where she underwent short acting propofol. 

 

With one attempt at 200 joules, she was successfully converted back to a normal sinus rhythm. 

 No difficulties or complications were encountered.

## 2022-09-28 ENCOUNTER — HOSPITAL ENCOUNTER (OUTPATIENT)
Dept: HOSPITAL 92 - CSHMAMMO | Age: 82
Discharge: HOME | End: 2022-09-28
Attending: INTERNAL MEDICINE
Payer: MEDICARE

## 2022-09-28 DIAGNOSIS — Z12.31: Primary | ICD-10-CM

## 2022-09-28 PROCEDURE — 77063 BREAST TOMOSYNTHESIS BI: CPT

## 2022-09-28 PROCEDURE — 77067 SCR MAMMO BI INCL CAD: CPT

## 2023-01-09 ENCOUNTER — HOSPITAL ENCOUNTER (OUTPATIENT)
Dept: HOSPITAL 92 - SDC | Age: 83
Discharge: HOME | End: 2023-01-09
Attending: INTERNAL MEDICINE
Payer: MEDICARE

## 2023-01-09 VITALS — BODY MASS INDEX: 30.2 KG/M2

## 2023-01-09 DIAGNOSIS — Z79.899: ICD-10-CM

## 2023-01-09 DIAGNOSIS — Z88.2: ICD-10-CM

## 2023-01-09 DIAGNOSIS — Z88.5: ICD-10-CM

## 2023-01-09 DIAGNOSIS — I50.42: ICD-10-CM

## 2023-01-09 DIAGNOSIS — Z88.0: ICD-10-CM

## 2023-01-09 DIAGNOSIS — I11.0: ICD-10-CM

## 2023-01-09 DIAGNOSIS — E78.2: ICD-10-CM

## 2023-01-09 DIAGNOSIS — Z95.0: ICD-10-CM

## 2023-01-09 DIAGNOSIS — Z79.01: ICD-10-CM

## 2023-01-09 DIAGNOSIS — I08.3: ICD-10-CM

## 2023-01-09 DIAGNOSIS — I48.3: ICD-10-CM

## 2023-01-09 DIAGNOSIS — I48.0: Primary | ICD-10-CM

## 2023-01-09 DIAGNOSIS — K21.9: ICD-10-CM

## 2023-01-09 PROCEDURE — 92960 CARDIOVERSION ELECTRIC EXT: CPT

## 2023-01-09 PROCEDURE — 93005 ELECTROCARDIOGRAM TRACING: CPT

## 2023-01-09 PROCEDURE — B246ZZ4 ULTRASONOGRAPHY OF RIGHT AND LEFT HEART, TRANSESOPHAGEAL: ICD-10-PCS | Performed by: INTERNAL MEDICINE

## 2023-01-09 PROCEDURE — 93010 ELECTROCARDIOGRAM REPORT: CPT

## 2023-01-09 PROCEDURE — 93312 ECHO TRANSESOPHAGEAL: CPT

## 2023-01-09 PROCEDURE — 5A2204Z RESTORATION OF CARDIAC RHYTHM, SINGLE: ICD-10-PCS | Performed by: INTERNAL MEDICINE
